# Patient Record
Sex: FEMALE | Race: WHITE | NOT HISPANIC OR LATINO | Employment: FULL TIME | URBAN - METROPOLITAN AREA
[De-identification: names, ages, dates, MRNs, and addresses within clinical notes are randomized per-mention and may not be internally consistent; named-entity substitution may affect disease eponyms.]

---

## 2018-02-12 ENCOUNTER — TELEPHONE (OUTPATIENT)
Dept: SURGERY | Facility: CLINIC | Age: 28
End: 2018-02-12

## 2018-02-12 NOTE — TELEPHONE ENCOUNTER
Referred from the [employer] website.  No longer wants the [employer].  Discussed medical weight management, gastric balloon and sleeve gastrectomy.  States is interested in the medical weight management option, given contact number to call the Call Center and set up appt. (also needs to update insurance in Epic).

## 2018-07-30 ENCOUNTER — OFFICE VISIT (OUTPATIENT)
Dept: FAMILY MEDICINE | Facility: OTHER | Age: 28
End: 2018-07-30
Attending: NURSE PRACTITIONER
Payer: COMMERCIAL

## 2018-07-30 VITALS — SYSTOLIC BLOOD PRESSURE: 126 MMHG | DIASTOLIC BLOOD PRESSURE: 80 MMHG | WEIGHT: 293 LBS | TEMPERATURE: 97.4 F

## 2018-07-30 DIAGNOSIS — N32.9 BLADDER PROBLEM: Primary | ICD-10-CM

## 2018-07-30 DIAGNOSIS — R10.31 ABDOMINAL PAIN, RIGHT LOWER QUADRANT: ICD-10-CM

## 2018-07-30 LAB
ALBUMIN UR-MCNC: NEGATIVE MG/DL
ANION GAP SERPL CALCULATED.3IONS-SCNC: 8 MMOL/L (ref 3–14)
APPEARANCE UR: CLEAR
BASOPHILS # BLD AUTO: 0 10E9/L (ref 0–0.2)
BASOPHILS NFR BLD AUTO: 0.1 %
BILIRUB UR QL STRIP: NEGATIVE
BUN SERPL-MCNC: 6 MG/DL (ref 7–25)
CALCIUM SERPL-MCNC: 9.1 MG/DL (ref 8.6–10.3)
CHLORIDE SERPL-SCNC: 108 MMOL/L (ref 98–107)
CO2 SERPL-SCNC: 22 MMOL/L (ref 21–31)
COLOR UR AUTO: YELLOW
CREAT SERPL-MCNC: 0.7 MG/DL (ref 0.6–1.2)
DIFFERENTIAL METHOD BLD: ABNORMAL
EOSINOPHIL # BLD AUTO: 0.1 10E9/L (ref 0–0.7)
EOSINOPHIL NFR BLD AUTO: 1.4 %
ERYTHROCYTE [DISTWIDTH] IN BLOOD BY AUTOMATED COUNT: 17.8 % (ref 10–15)
GFR SERPL CREATININE-BSD FRML MDRD: >90 ML/MIN/1.7M2
GLUCOSE SERPL-MCNC: 130 MG/DL (ref 70–105)
GLUCOSE UR STRIP-MCNC: NEGATIVE MG/DL
HCT VFR BLD AUTO: 40.8 % (ref 35–47)
HGB BLD-MCNC: 12.7 G/DL (ref 11.7–15.7)
HGB UR QL STRIP: NEGATIVE
IMM GRANULOCYTES # BLD: 0.1 10E9/L (ref 0–0.4)
IMM GRANULOCYTES NFR BLD: 1 %
KETONES UR STRIP-MCNC: NEGATIVE MG/DL
LEUKOCYTE ESTERASE UR QL STRIP: ABNORMAL
LYMPHOCYTES # BLD AUTO: 2.2 10E9/L (ref 0.8–5.3)
LYMPHOCYTES NFR BLD AUTO: 21.9 %
MCH RBC QN AUTO: 23.1 PG (ref 26.5–33)
MCHC RBC AUTO-ENTMCNC: 31.1 G/DL (ref 31.5–36.5)
MCV RBC AUTO: 74 FL (ref 78–100)
MONOCYTES # BLD AUTO: 0.4 10E9/L (ref 0–1.3)
MONOCYTES NFR BLD AUTO: 3.8 %
NEUTROPHILS # BLD AUTO: 7.1 10E9/L (ref 1.6–8.3)
NEUTROPHILS NFR BLD AUTO: 71.8 %
NITRATE UR QL: NEGATIVE
PH UR STRIP: 6.5 PH (ref 5–9)
PLATELET # BLD AUTO: 300 10E9/L (ref 150–450)
POTASSIUM SERPL-SCNC: 3.8 MMOL/L (ref 3.5–5.1)
RBC # BLD AUTO: 5.5 10E12/L (ref 3.8–5.2)
RBC #/AREA URNS AUTO: NORMAL /HPF
SODIUM SERPL-SCNC: 138 MMOL/L (ref 134–144)
SOURCE: ABNORMAL
SP GR UR STRIP: 1.01 (ref 1–1.03)
UROBILINOGEN UR STRIP-ACNC: 0.2 EU/DL (ref 0.2–1)
WBC # BLD AUTO: 10 10E9/L (ref 4–11)
WBC #/AREA URNS AUTO: NORMAL /HPF

## 2018-07-30 PROCEDURE — G0463 HOSPITAL OUTPT CLINIC VISIT: HCPCS

## 2018-07-30 PROCEDURE — 80048 BASIC METABOLIC PNL TOTAL CA: CPT | Performed by: NURSE PRACTITIONER

## 2018-07-30 PROCEDURE — 87086 URINE CULTURE/COLONY COUNT: CPT | Performed by: NURSE PRACTITIONER

## 2018-07-30 PROCEDURE — 85025 COMPLETE CBC W/AUTO DIFF WBC: CPT | Performed by: NURSE PRACTITIONER

## 2018-07-30 PROCEDURE — 99203 OFFICE O/P NEW LOW 30 MIN: CPT | Performed by: NURSE PRACTITIONER

## 2018-07-30 PROCEDURE — 81001 URINALYSIS AUTO W/SCOPE: CPT | Performed by: NURSE PRACTITIONER

## 2018-07-30 PROCEDURE — 36415 COLL VENOUS BLD VENIPUNCTURE: CPT | Performed by: NURSE PRACTITIONER

## 2018-07-30 NOTE — PROGRESS NOTES
Nursing Notes:   Marie Ken CMA  7/30/2018  1:10 PM  Signed  Patient presents to clinic today for possible UTI. Having back pain, frequency, RLQ discomfort. Symptoms x 2 days.  Marie Ken [facility]..............7/30/2018........1:01 PM        SUBJECTIVE:   Gabbie Tavares is a 28 year old female who presents to clinic today for the following health issues:    URINARY TRACT SYMPTOMS      Duration: 2 days    Description  frequency and Back pain, RLQ pain    Intensity:  moderate    Accompanying signs and symptoms:  Fever/chills: no   Flank pain YES- Back pain, low back   Nausea and vomiting: no   Vaginal symptoms: none  Abdominal/Pelvic Pain: YES- RLQ pain    History  History of frequent UTI's: no   History of kidney stones: YES  Sexually Active: YES  Possibility of pregnancy: No    Precipitating or alleviating factors: None    Therapies tried and outcome: increase fluid intake and ibuprofen   Outcome: not helpful  Has tried gas meds, increased water, RT flank pain and into pelvis. Pain is constant, uncomfortable and worse with standing, walking, better/not as intense with resting, sitting.     Surgery: has had gallbladder out.   Problem list and histories reviewed & adjusted, as indicated.  Additional history: as documented    Current Outpatient Prescriptions   Medication Sig Dispense Refill     norgestrel-ethinyl estradiol (LOW-OGESTREL) 0.3-30 MG-MCG per tablet Take 1 tablet by mouth       Allergies   Allergen Reactions     Acetaminophen Nausea and Vomiting     Latex Other (See Comments)     LATEX PRECAUTIONS due to hx of seasonal allergies and frequent exposure to latex     Penicillins Rash         ROS:  Notable findings in the HPI.       OBJECTIVE:     /80  Temp 97.4  F (36.3  C) (Tympanic)  Wt 310 lb 4.8 oz (140.8 kg)  LMP 06/27/2018 (Exact Date)  Breastfeeding? No  There is no height or weight on file to calculate BMI.  GENERAL: healthy, alert and no distress  EYES: Eyes grossly normal to  inspection  HENT: normal cephalic/atraumatic and oral mucous membranes moist  NECK: no adenopathy  RESP: lungs clear to auscultation - no rales, rhonchi or wheezes  CV: regular rate and rhythm, normal S1 S2, no S3 or S4, no murmur, click or rub, no peripheral edema and peripheral pulses strong  ABDOMEN: tenderness RLQ, no organomegaly or masses, liver span normal to percussion and bowel sounds normal  SKIN: no suspicious lesions or rashes  BACK: no CVA tenderness, no paralumbar tenderness  PSYCH: mentation appears normal, affect normal/bright    Diagnostic Test Results:  Results for orders placed or performed in visit on 07/30/18 (from the past 24 hour(s))   *UA reflex to Microscopic   Result Value Ref Range    Color Urine Yellow     Appearance Urine Clear     Glucose Urine Negative NEG^Negative mg/dL    Bilirubin Urine Negative NEG^Negative    Ketones Urine Negative NEG^Negative mg/dL    Specific Gravity Urine 1.010 1.000 - 1.030    Blood Urine Negative NEG^Negative    pH Urine 6.5 5.0 - 9.0 pH    Protein Albumin Urine Negative NEG^Negative mg/dL    Urobilinogen Urine 0.2 0.2 - 1.0 EU/dL    Nitrite Urine Negative NEG^Negative    Leukocyte Esterase Urine Trace (A) NEG^Negative    Source Midstream Urine    Urine Microscopic   Result Value Ref Range    WBC Urine 0 - 5 OTO5^0 - 5 /HPF    RBC Urine O - 2 OTO2^O - 2 /HPF   CBC with platelets differential   Result Value Ref Range    WBC 10.0 4.0 - 11.0 10e9/L    RBC Count 5.50 (H) 3.8 - 5.2 10e12/L    Hemoglobin 12.7 11.7 - 15.7 g/dL    Hematocrit 40.8 35.0 - 47.0 %    MCV 74 (L) 78 - 100 fl    MCH 23.1 (L) 26.5 - 33.0 pg    MCHC 31.1 (L) 31.5 - 36.5 g/dL    RDW 17.8 (H) 10.0 - 15.0 %    Platelet Count 300 150 - 450 10e9/L    Diff Method Automated Method     % Neutrophils 71.8 %    % Lymphocytes 21.9 %    % Monocytes 3.8 %    % Eosinophils 1.4 %    % Basophils 0.1 %    % Immature Granulocytes 1.0 %    Absolute Neutrophil 7.1 1.6 - 8.3 10e9/L    Absolute Lymphocytes 2.2  0.8 - 5.3 10e9/L    Absolute Monocytes 0.4 0.0 - 1.3 10e9/L    Absolute Eosinophils 0.1 0.0 - 0.7 10e9/L    Absolute Basophils 0.0 0.0 - 0.2 10e9/L    Abs Immature Granulocytes 0.1 0 - 0.4 10e9/L       ASSESSMENT/PLAN:     1. Bladder problem  - *UA reflex to Microscopic  - Urine Microscopic  - Urine Culture Aerobic Bacterial    2. Abdominal pain, right lower quadrant  - CBC with platelets differential  - Basic Metabolic Panel  - Urine Culture Aerobic Bacterial    Medical Decision Making:    Differential Diagnosis:  Abdominal Pain: Appendix, UTI, Pyelonephritis, Kidney Stone, Abdominal Wall and Non Specific    Serious Comorbid Conditions:  Adult:  None    PLAN:    ABD Pain:  Tylenol, Ibuprofen, Miralax, Fluids, time, rest and Refer to ED if pain worsens or not improving. The labs are reassuring, f/u if needed.     Followup:    If not improving or if condition worsens, follow up with your Primary Care Provider    Disclaimer:  This note consists of words and symbols derived from keyboarding, dictation, or using voice recognition software. As a result, there may be errors in the script that have gone undetected. Please consider this when interpreting information found in this note.      Khadijah Williamson NP, 7/30/2018 12:58 PM

## 2018-07-30 NOTE — NURSING NOTE
Patient presents to clinic today for possible UTI. Having back pain, frequency, RLQ discomfort. Symptoms x 2 days.  Marie Ken CMA..............7/30/2018........1:01 PM

## 2018-07-30 NOTE — PATIENT INSTRUCTIONS

## 2018-07-30 NOTE — MR AVS SNAPSHOT
After Visit Summary   7/30/2018    Gabbie Tavares    MRN: [id number]           Patient Information     Date Of Birth          1990        Visit Information        Provider Department      7/30/2018 11:30 AM Khadijah Williamson NP [facility]        Today's Diagnoses     Bladder problem    -  1    Abdominal pain, right lower quadrant          Care Instructions      Abdominal Pain  Abdominal pain is pain in the stomach or belly area. Everyone has this pain from time to time. In many cases it goes away on its own. But abdominal pain can sometimes be due to a serious problem, such as appendicitis. So it s important to know when to seek help.  Causes of abdominal pain  There are many possible causes of abdominal pain. Common causes in adults include:    Constipation, diarrhea, or gas    Stomach acid flowing back up into the esophagus (acid reflux or heartburn)    Severe acid reflux, called GERD (gastroesophageal reflux disease)    A sore in the lining of the stomach or small intestine (peptic ulcer)    Inflammation of the gallbladder, liver, or pancreas    Gallstones or kidney stones    Appendicitis     Intestinal blockage     An internal organ pushing through a muscle or other tissue (hernia)    Urinary tract infections    In women, menstrual cramps, fibroids, or endometriosis    Inflammation or infection of the intestines  Diagnosing the cause of abdominal pain  Your healthcare provider will do a physical exam help find the cause of your pain. If needed, tests will be ordered. Belly pain has many possible causes. So it can be hard to find the reason for your pain. Giving details about your pain can help. Tell your provider where and when you feel the pain, and what makes it better or worse. Also let your provider know if you have other symptoms such as:    Fever    Tiredness    Upset stomach (nausea)    Vomiting    Changes in bathroom habits  Treating abdominal pain  Some causes  of pain need emergency medical treatment right away. These include appendicitis or a bowel blockage. Other problems can be treated with rest, fluids, or medicines. Your healthcare provider can give you specific instructions for treatment or self-care based on what is causing your pain.  If you have vomiting or diarrhea, sip water or other clear fluids. When you are ready to eat solid foods again, start with small amounts of easy-to-digest, low-fat foods. These include apple sauce, toast, or crackers.   When to seek medical care  Call 911 or go to the hospital right away if you:    Can t pass stool and are vomiting    Are vomiting blood or have bloody diarrhea or black, tarry diarrhea    Have chest, neck, or shoulder pain    Feel like you might pass out    Have pain in your shoulder blades with nausea    Have sudden, severe belly pain    Have new, severe pain unlike any you have felt before    Have a belly that is rigid, hard, and tender to touch  Call your healthcare provider if you have:    Pain for more than 5 days    Bloating for more than 2 days    Diarrhea for more than 5 days    A fever of 100.4 F (38 C) or higher, or as directed by your healthcare provider    Pain that gets worse    Weight loss for no reason    Continued lack of appetite    Blood in your stool  How to prevent abdominal pain  Here are some tips to help prevent abdominal pain:    Eat smaller amounts of food at one time.    Avoid greasy, fried, or other high-fat foods.    Avoid foods that give you gas.    Exercise regularly.    Drink plenty of fluids.  To help prevent GERD symptoms:    Quit smoking.    Reduce alcohol and certain foods that increase stomach acid.    Avoid aspirin and over-the-counter pain and fever medicines (NSAIDS or nonsteroidal anti-inflammatory drugs), if possible    Lose extra weight.    Finish eating at least 2 hours before you go to bed or lie down.    Raise the head of your bed.                  Follow-ups after your  "visit        Who to contact     If you have questions or need follow up information about today's clinic visit or your schedule please contact [facility] directly at [phone].  Normal or non-critical lab and imaging results will be communicated to you by MyChart, letter or phone within 4 business days after the clinic has received the results. If you do not hear from us within 7 days, please contact the clinic through MyChart or phone. If you have a critical or abnormal lab result, we will notify you by phone as soon as possible.  Submit refill requests through [employer] or call your pharmacy and they will forward the refill request to us. Please allow 3 business days for your refill to be completed.          Additional Information About Your Visit        [email or web address] Information     [employer] lets you send messages to your doctor, view your test results, renew your prescriptions, schedule appointments and more. To sign up, go to [email or web address] . Click on \"Log in\" on the left side of the screen, which will take you to the Welcome page. Then click on \"Sign up Now\" on the right side of the page.     You will be asked to enter the access code listed below, as well as some personal information. Please follow the directions to create your username and password.     Your access code is: [email or web address]  Expires: 10/28/2018  1:48 PM     Your access code will  in 90 days. If you need help or a new code, please call your [place] clinic or [phone].        Care EveryWhere ID     This is your Care EveryWhere ID. This could be used by other organizations to access your [place] medical records  [id number]        Your Vitals Were     Temperature Last Period Breastfeeding?             97.4  F (36.3  C) (Tympanic) 2018 (Exact Date) No          Blood Pressure from Last 3 Encounters:   18 126/80    Weight from Last 3 Encounters:   18 310 lb 4.8 oz (140.8 kg)              We " Performed the Following     *UA reflex to Microscopic     Basic Metabolic Panel     CBC with platelets differential     Urine Culture Aerobic Bacterial     Urine Microscopic        Primary Care Provider Fax #    Physician No Ref-Primary [phone]       No address on file        Equal Access to Services     VIVIAN GABRIEL : Hadii aad ku hadmariangel Marquez, naeem dhillon, bota kashayne galarza, sandra servin darwinpatricia strong aliya lockhart. So [facility] [phone].    ATENCIÓN: Si habla español, tiene a choudhury disposición servicios gratuitos de asistencia lingüística. Llame al [phone].    We comply with applicable federal civil rights laws and [place] laws. We do not discriminate on the basis of race, color, national origin, age, disability, sex, sexual orientation, or gender identity.            Thank you!     Thank you for choosing [facility]  for your care. Our goal is always to provide you with excellent care. Hearing back from our patients is one way we can continue to improve our services. Please take a few minutes to complete the written survey that you may receive in the mail after your visit with us. Thank you!             Your Updated Medication List - Protect others around you: Learn how to safely use, store and throw away your medicines at www.disposemymeds.org.          This list is accurate as of 7/30/18  1:48 PM.  Always use your most recent med list.                   Brand Name Dispense Instructions for use Diagnosis    LOW-OGESTREL 0.3-30 MG-MCG per tablet   Generic drug:  norgestrel-ethinyl estradiol      Take 1 tablet by mouth

## 2018-08-02 LAB
BACTERIA SPEC CULT: NORMAL
SPECIMEN SOURCE: NORMAL

## 2019-04-30 DIAGNOSIS — O36.80X0 ENCOUNTER TO DETERMINE FETAL VIABILITY OF PREGNANCY, SINGLE OR UNSPECIFIED FETUS: Primary | ICD-10-CM

## 2019-04-30 NOTE — PROGRESS NOTES
Patient is 5-7 months pregnant and here tomorrow for first OB appointment.  Ultrasound ordered.  US notified and will schedule patient at 10 or 11:15 tomorrow.     Coco Houston RN on 4/30/2019 at 4:58 PM

## 2019-05-01 ENCOUNTER — HOSPITAL ENCOUNTER (OUTPATIENT)
Dept: ULTRASOUND IMAGING | Facility: OTHER | Age: 29
Discharge: HOME | End: 2019-05-01
Attending: OBSTETRICS & GYNECOLOGY | Admitting: OBSTETRICS & GYNECOLOGY
Payer: COMMERCIAL

## 2019-05-01 ENCOUNTER — PRENATAL OFFICE VISIT (OUTPATIENT)
Dept: OBGYN | Facility: OTHER | Age: 29
End: 2019-05-01
Attending: OBSTETRICS & GYNECOLOGY
Payer: COMMERCIAL

## 2019-05-01 VITALS
HEART RATE: 72 BPM | WEIGHT: 293 LBS | HEIGHT: 66 IN | DIASTOLIC BLOOD PRESSURE: 70 MMHG | SYSTOLIC BLOOD PRESSURE: 122 MMHG | BODY MASS INDEX: 47.09 KG/M2

## 2019-05-01 DIAGNOSIS — O36.80X0 ENCOUNTER TO DETERMINE FETAL VIABILITY OF PREGNANCY, SINGLE OR UNSPECIFIED FETUS: ICD-10-CM

## 2019-05-01 DIAGNOSIS — Z32.01 PREGNANCY CONFIRMED BY POSITIVE URINE TEST: Primary | ICD-10-CM

## 2019-05-01 LAB — B-HCG SERPL-ACNC: <1 IU/L

## 2019-05-01 PROCEDURE — 76801 OB US < 14 WKS SINGLE FETUS: CPT

## 2019-05-01 PROCEDURE — G0463 HOSPITAL OUTPT CLINIC VISIT: HCPCS | Mod: 25

## 2019-05-01 PROCEDURE — 99214 OFFICE O/P EST MOD 30 MIN: CPT | Performed by: OBSTETRICS & GYNECOLOGY

## 2019-05-01 PROCEDURE — 84702 CHORIONIC GONADOTROPIN TEST: CPT | Mod: ZL | Performed by: OBSTETRICS & GYNECOLOGY

## 2019-05-01 PROCEDURE — G0463 HOSPITAL OUTPT CLINIC VISIT: HCPCS

## 2019-05-01 PROCEDURE — 36415 COLL VENOUS BLD VENIPUNCTURE: CPT | Mod: ZL | Performed by: OBSTETRICS & GYNECOLOGY

## 2019-05-01 ASSESSMENT — MIFFLIN-ST. JEOR: SCORE: 2102.54

## 2019-05-01 ASSESSMENT — PAIN SCALES - GENERAL: PAINLEVEL: NO PAIN (0)

## 2019-05-01 NOTE — NURSING NOTE
Chief Complaint   Patient presents with     Consult     amenorrhea    amenorrhea X LMP 11/16/18  Has had negative pregnancy tests but recently had an extra faint line.  Sister just had a baby and stated her pregnancy and HCG blood levels were normal during entire pregnancy.      Cecy Yung LPN........................5/1/2019  9:14 AM     Medication Reconciliation: completed   Cecy Yung LPN  5/1/2019 9:13 AM

## 2019-05-01 NOTE — PROGRESS NOTES
CC: possible pregnancy  HPI:  Gabbie is a 29 year old female who presents for amenorrhea since November 2018. She states that she took a home pregnancy test and that it was faintly positive, has not been trying to get pregnant. No birth control use since August of 2018, not currently desiring to get pregnant. Had some mild spotting in February but no other vaginal bleeding. She does not desire any hormonal or implanted birth control, would possibly want another pregnancy in the future.    Patient's last menstrual period was 11/16/2018.  Menstrual history: regular until about mid 2017, then prolonged bleeding and started OCPs.   Bladder concerns: denies  Bowel concerns: denies  Breast concerns: denies  Birth control: none  STI history: denies    Pap Smears: 3/22/18 NIL    OB History   No data available     No past medical history on file.  No past surgical history on file.  Social History     Socioeconomic History     Marital status: Single     Spouse name: Not on file     Number of children: Not on file     Years of education: Not on file     Highest education level: Not on file   Occupational History     Not on file   Social Needs     Financial resource strain: Not on file     Food insecurity:     Worry: Not on file     Inability: Not on file     Transportation needs:     Medical: Not on file     Non-medical: Not on file   Tobacco Use     Smoking status: Current Every Day Smoker     Smokeless tobacco: Never Used   Substance and Sexual Activity     Alcohol use: Yes     Drug use: No     Sexual activity: Yes     Partners: Male     Birth control/protection: Pill   Lifestyle     Physical activity:     Days per week: Not on file     Minutes per session: Not on file     Stress: Not on file   Relationships     Social connections:     Talks on phone: Not on file     Gets together: Not on file     Attends [employer] service: Not on file     Active member of club or organization: Not on file     Attends meetings of clubs or  "organizations: Not on file     Relationship status: Not on file     Intimate partner violence:     Fear of current or ex partner: Not on file     Emotionally abused: Not on file     Physically abused: Not on file     Forced sexual activity: Not on file   Other Topics Concern     Parent/sibling w/ CABG, MI or angioplasty before 65F 55M? Not Asked   Social History Narrative     Not on file     No family history on file.    Current Outpatient Medications   Medication     norgestrel-ethinyl estradiol (LOW-OGESTREL) 0.3-30 MG-MCG per tablet     No current facility-administered medications for this visit.      Allergies   Allergen Reactions     Acetaminophen Nausea and Vomiting     Other reaction(s): Gastrointestinal  Mild per pt.     Latex Other (See Comments)     LATEX PRECAUTIONS due to hx of seasonal allergies and frequent exposure to latex     Penicillins Rash     PN: LW Reaction: rash/facial swelling     /70 (BP Location: Right arm, Patient Position: Sitting, Cuff Size: Adult Large)   Pulse 72   Ht 1.676 m (5' 6\")   Wt 136.1 kg (300 lb)   LMP 11/16/2018   Breastfeeding? No   BMI 48.42 kg/m      REVIEW OF SYSTEMS  General: negative, fever, chills  GI: negative, nausea and vomiting  : negative, sexually transmitted disease and vaginal bleeding  Endocrine: negative, thyroid disorder and galactorrhea    Exam:  Constitutional: healthy, alert and no distress  Head: Normocephalic. No masses, lesions, tenderness or abnormalities  Psychiatric: mentation appears normal and affect normal/bright        Lab:   Results for orders placed or performed in visit on 07/30/18   *UA reflex to Microscopic   Result Value Ref Range    Color Urine Yellow     Appearance Urine Clear     Glucose Urine Negative NEG^Negative mg/dL    Bilirubin Urine Negative NEG^Negative    Ketones Urine Negative NEG^Negative mg/dL    Specific Gravity Urine 1.010 1.000 - 1.030    Blood Urine Negative NEG^Negative    pH Urine 6.5 5.0 - 9.0 pH    " Protein Albumin Urine Negative NEG^Negative mg/dL    Urobilinogen Urine 0.2 0.2 - 1.0 EU/dL    Nitrite Urine Negative NEG^Negative    Leukocyte Esterase Urine Trace (A) NEG^Negative    Source Midstream Urine    Urine Microscopic   Result Value Ref Range    WBC Urine 0 - 5 OTO5^0 - 5 /HPF    RBC Urine O - 2 OTO2^O - 2 /HPF   CBC with platelets differential   Result Value Ref Range    WBC 10.0 4.0 - 11.0 10e9/L    RBC Count 5.50 (H) 3.8 - 5.2 10e12/L    Hemoglobin 12.7 11.7 - 15.7 g/dL    Hematocrit 40.8 35.0 - 47.0 %    MCV 74 (L) 78 - 100 fl    MCH 23.1 (L) 26.5 - 33.0 pg    MCHC 31.1 (L) 31.5 - 36.5 g/dL    RDW 17.8 (H) 10.0 - 15.0 %    Platelet Count 300 150 - 450 10e9/L    Diff Method Automated Method     % Neutrophils 71.8 %    % Lymphocytes 21.9 %    % Monocytes 3.8 %    % Eosinophils 1.4 %    % Basophils 0.1 %    % Immature Granulocytes 1.0 %    Absolute Neutrophil 7.1 1.6 - 8.3 10e9/L    Absolute Lymphocytes 2.2 0.8 - 5.3 10e9/L    Absolute Monocytes 0.4 0.0 - 1.3 10e9/L    Absolute Eosinophils 0.1 0.0 - 0.7 10e9/L    Absolute Basophils 0.0 0.0 - 0.2 10e9/L    Abs Immature Granulocytes 0.1 0 - 0.4 10e9/L   Basic Metabolic Panel   Result Value Ref Range    Sodium 138 134 - 144 mmol/L    Potassium 3.8 3.5 - 5.1 mmol/L    Chloride 108 (H) 98 - 107 mmol/L    Carbon Dioxide 22 21 - 31 mmol/L    Anion Gap 8 3 - 14 mmol/L    Glucose 130 (H) 70 - 105 mg/dL    Urea Nitrogen 6 (L) 7 - 25 mg/dL    Creatinine 0.70 0.60 - 1.20 mg/dL    GFR Estimate >90 >60 mL/min/1.7m2    GFR Estimate If Black >90 >60 mL/min/1.7m2    Calcium 9.1 8.6 - 10.3 mg/dL   Urine Culture Aerobic Bacterial   Result Value Ref Range    Specimen Description Midstream Urine     Culture Micro       10,000 to 50,000 colonies/mL  mixed urogenital garfield  No further identification or sensitivity done         ASSESSMENT/PLAN :  1. Pregnancy confirmed by positive urine test          29 year old female with positive home pregnancy test. OB US today inconclusive  - possible very early gestational sac vs endometrial fluid collection/cyst. Ordered quantitative HCG today to further evaluate pregnancy and will follow up with patient with results. Plan to return to clinic in about 2 weeks for further evaluation.    Patient evaluated by Shelby Bell MD  10:27 AM 5/1/2019     Addendum:  Quant hcg negative  Will have patient return in 2 weeks, repeat urine hcg at that time.  (May need repeat US to convince patient that she is not pregnant)  Would be a good candidate for a Mirena

## 2019-05-01 NOTE — RESULT ENCOUNTER NOTE
Gabbie had a + urine Hcg but our serum is negative.  I would like her to see me back in 2 weeks.  Please obtain a urine hcg when she arrives.  Thank you

## 2019-05-03 ENCOUNTER — HEALTH MAINTENANCE LETTER (OUTPATIENT)
Age: 29
End: 2019-05-03

## 2019-07-28 ENCOUNTER — OFFICE VISIT (OUTPATIENT)
Dept: FAMILY MEDICINE | Facility: OTHER | Age: 29
End: 2019-07-28
Attending: NURSE PRACTITIONER
Payer: COMMERCIAL

## 2019-07-28 VITALS
RESPIRATION RATE: 16 BRPM | HEIGHT: 66 IN | SYSTOLIC BLOOD PRESSURE: 118 MMHG | TEMPERATURE: 97.9 F | BODY MASS INDEX: 47.09 KG/M2 | HEART RATE: 72 BPM | WEIGHT: 293 LBS | DIASTOLIC BLOOD PRESSURE: 78 MMHG

## 2019-07-28 DIAGNOSIS — J02.0 STREP THROAT: Primary | ICD-10-CM

## 2019-07-28 LAB
DEPRECATED S PYO AG THROAT QL EIA: ABNORMAL
SPECIMEN SOURCE: ABNORMAL

## 2019-07-28 PROCEDURE — G0463 HOSPITAL OUTPT CLINIC VISIT: HCPCS

## 2019-07-28 PROCEDURE — 87880 STREP A ASSAY W/OPTIC: CPT | Mod: ZL | Performed by: NURSE PRACTITIONER

## 2019-07-28 PROCEDURE — 99213 OFFICE O/P EST LOW 20 MIN: CPT | Performed by: FAMILY MEDICINE

## 2019-07-28 RX ORDER — AZITHROMYCIN 250 MG/1
250 TABLET, FILM COATED ORAL DAILY
Qty: 6 TABLET | Refills: 0 | Status: SHIPPED | OUTPATIENT
Start: 2019-07-28 | End: 2019-07-30

## 2019-07-28 RX ORDER — AZITHROMYCIN 250 MG/1
250 TABLET, FILM COATED ORAL DAILY
Status: DISCONTINUED | OUTPATIENT
Start: 2019-07-29 | End: 2020-10-16

## 2019-07-28 ASSESSMENT — PAIN SCALES - GENERAL: PAINLEVEL: EXTREME PAIN (8)

## 2019-07-28 ASSESSMENT — MIFFLIN-ST. JEOR: SCORE: 2102.54

## 2019-07-29 NOTE — NURSING NOTE
"Chief Complaint   Patient presents with     Pharyngitis     Pt present to clinic today for a sore throat she has had for 3 days.    Initial /78 (BP Location: Left arm, Patient Position: Sitting, Cuff Size: Adult Large)   Pulse 72   Temp 97.9  F (36.6  C) (Tympanic)   Resp 16   Ht 1.676 m (5' 6\")   Wt 136.1 kg (300 lb)   BMI 48.42 kg/m   Estimated body mass index is 48.42 kg/m  as calculated from the following:    Height as of this encounter: 1.676 m (5' 6\").    Weight as of this encounter: 136.1 kg (300 lb).  Medication Reconciliation: complete    Milli Galloway LPN  "

## 2019-07-30 ENCOUNTER — TELEPHONE (OUTPATIENT)
Dept: FAMILY MEDICINE | Facility: OTHER | Age: 29
End: 2019-07-30

## 2019-07-30 DIAGNOSIS — J02.0 STREP THROAT: ICD-10-CM

## 2019-07-30 RX ORDER — AZITHROMYCIN 250 MG/1
250 TABLET, FILM COATED ORAL DAILY
Qty: 6 TABLET | Refills: 0 | Status: SHIPPED | OUTPATIENT
Start: 2019-07-30 | End: 2020-10-16

## 2019-07-30 NOTE — TELEPHONE ENCOUNTER
Refill sent to [facility]  Please notify patient  Sapna MEETA Bunch NP on 7/30/2019 at 2:00 PM

## 2019-07-30 NOTE — TELEPHONE ENCOUNTER
Patient states she was prescribed Zithromax for 5 days. She states she has 3 left and she was supposed to take two on the first day and then one daily after initial dose. She is inquiring about a refill so she can finish the series. Please advise.  Tierney Regan LPN on 7/30/2019 at 12:46 PM

## 2019-07-30 NOTE — TELEPHONE ENCOUNTER
Pt seen in [facility] by Yeimi Santos; given Z-Tushar from [employer]. Pt states remaining days/doses of medication were lost. Inquiring about options for refill/replacement or other course of action to take.  Ph. [phone]    Thank you.  Coco Temple

## 2019-08-19 ENCOUNTER — MYC MEDICAL ADVICE (OUTPATIENT)
Dept: OBGYN | Facility: OTHER | Age: 29
End: 2019-08-19

## 2020-03-11 ENCOUNTER — HEALTH MAINTENANCE LETTER (OUTPATIENT)
Age: 30
End: 2020-03-11

## 2020-10-16 ENCOUNTER — APPOINTMENT (OUTPATIENT)
Dept: CT IMAGING | Facility: OTHER | Age: 30
End: 2020-10-16
Attending: FAMILY MEDICINE
Payer: COMMERCIAL

## 2020-10-16 ENCOUNTER — OFFICE VISIT (OUTPATIENT)
Dept: FAMILY MEDICINE | Facility: OTHER | Age: 30
End: 2020-10-16
Attending: FAMILY MEDICINE
Payer: COMMERCIAL

## 2020-10-16 ENCOUNTER — HOSPITAL ENCOUNTER (EMERGENCY)
Facility: OTHER | Age: 30
Discharge: HOME | End: 2020-10-17
Attending: FAMILY MEDICINE | Admitting: FAMILY MEDICINE
Payer: COMMERCIAL

## 2020-10-16 VITALS
RESPIRATION RATE: 18 BRPM | BODY MASS INDEX: 48.2 KG/M2 | OXYGEN SATURATION: 98 % | TEMPERATURE: 98.2 F | SYSTOLIC BLOOD PRESSURE: 124 MMHG | WEIGHT: 289.3 LBS | DIASTOLIC BLOOD PRESSURE: 70 MMHG | HEART RATE: 80 BPM | HEIGHT: 65 IN

## 2020-10-16 DIAGNOSIS — J02.9 VIRAL PHARYNGITIS: ICD-10-CM

## 2020-10-16 DIAGNOSIS — K11.5 SALIVARY STONE: Primary | ICD-10-CM

## 2020-10-16 PROCEDURE — G0463 HOSPITAL OUTPT CLINIC VISIT: HCPCS

## 2020-10-16 PROCEDURE — G0463 HOSPITAL OUTPT CLINIC VISIT: HCPCS | Mod: 25,27

## 2020-10-16 PROCEDURE — 250N000011 HC RX IP 250 OP 636: Performed by: FAMILY MEDICINE

## 2020-10-16 PROCEDURE — 99285 EMERGENCY DEPT VISIT HI MDM: CPT | Mod: 25 | Performed by: FAMILY MEDICINE

## 2020-10-16 PROCEDURE — 70491 CT SOFT TISSUE NECK W/DYE: CPT | Mod: TC

## 2020-10-16 PROCEDURE — 85025 COMPLETE CBC W/AUTO DIFF WBC: CPT | Performed by: FAMILY MEDICINE

## 2020-10-16 PROCEDURE — 99213 OFFICE O/P EST LOW 20 MIN: CPT | Performed by: PHYSICIAN ASSISTANT

## 2020-10-16 PROCEDURE — 36415 COLL VENOUS BLD VENIPUNCTURE: CPT | Performed by: FAMILY MEDICINE

## 2020-10-16 PROCEDURE — 96374 THER/PROPH/DIAG INJ IV PUSH: CPT | Performed by: FAMILY MEDICINE

## 2020-10-16 PROCEDURE — 81025 URINE PREGNANCY TEST: CPT | Performed by: FAMILY MEDICINE

## 2020-10-16 PROCEDURE — 96375 TX/PRO/DX INJ NEW DRUG ADDON: CPT | Mod: XU | Performed by: FAMILY MEDICINE

## 2020-10-16 PROCEDURE — 84145 PROCALCITONIN (PCT): CPT | Performed by: FAMILY MEDICINE

## 2020-10-16 PROCEDURE — 258N000003 HC RX IP 258 OP 636: Performed by: FAMILY MEDICINE

## 2020-10-16 PROCEDURE — 99283 EMERGENCY DEPT VISIT LOW MDM: CPT | Performed by: FAMILY MEDICINE

## 2020-10-16 PROCEDURE — 86140 C-REACTIVE PROTEIN: CPT | Performed by: FAMILY MEDICINE

## 2020-10-16 PROCEDURE — 80048 BASIC METABOLIC PNL TOTAL CA: CPT | Performed by: FAMILY MEDICINE

## 2020-10-16 RX ORDER — ONDANSETRON 2 MG/ML
4 INJECTION INTRAMUSCULAR; INTRAVENOUS ONCE
Status: COMPLETED | OUTPATIENT
Start: 2020-10-16 | End: 2020-10-16

## 2020-10-16 RX ORDER — DEXAMETHASONE SODIUM PHOSPHATE 10 MG/ML
10 INJECTION, SOLUTION INTRAMUSCULAR; INTRAVENOUS ONCE
Status: COMPLETED | OUTPATIENT
Start: 2020-10-16 | End: 2020-10-16

## 2020-10-16 RX ORDER — SODIUM CHLORIDE 9 MG/ML
INJECTION, SOLUTION INTRAVENOUS CONTINUOUS
Status: DISCONTINUED | OUTPATIENT
Start: 2020-10-17 | End: 2020-10-17 | Stop reason: HOSPADM

## 2020-10-16 RX ORDER — MORPHINE SULFATE 4 MG/ML
4 INJECTION, SOLUTION INTRAMUSCULAR; INTRAVENOUS ONCE
Status: COMPLETED | OUTPATIENT
Start: 2020-10-16 | End: 2020-10-16

## 2020-10-16 RX ADMIN — MORPHINE SULFATE 4 MG: 4 INJECTION, SOLUTION INTRAMUSCULAR; INTRAVENOUS at 23:29

## 2020-10-16 RX ADMIN — DEXAMETHASONE SODIUM PHOSPHATE 10 MG: 10 INJECTION INTRAMUSCULAR; INTRAVENOUS at 23:20

## 2020-10-16 RX ADMIN — ONDANSETRON 4 MG: 2 INJECTION INTRAMUSCULAR; INTRAVENOUS at 23:20

## 2020-10-16 RX ADMIN — SODIUM CHLORIDE 1000 ML: 9 INJECTION, SOLUTION INTRAVENOUS at 23:21

## 2020-10-16 ASSESSMENT — ENCOUNTER SYMPTOMS
FEVER: 0
ABDOMINAL PAIN: 0
TROUBLE SWALLOWING: 0
NAUSEA: 0
VOMITING: 0
NECK PAIN: 1
EYE PAIN: 0
DYSURIA: 0
FACIAL SWELLING: 1
SORE THROAT: 0
HEADACHES: 0
COUGH: 0
COLOR CHANGE: 0
SHORTNESS OF BREATH: 0
DIARRHEA: 0
NECK STIFFNESS: 0
CHILLS: 0

## 2020-10-16 ASSESSMENT — MIFFLIN-ST. JEOR
SCORE: 2018.17
SCORE: 2033.14

## 2020-10-16 ASSESSMENT — PAIN SCALES - GENERAL: PAINLEVEL: MODERATE PAIN (4)

## 2020-10-16 NOTE — NURSING NOTE
"Chief Complaint   Patient presents with     Jaw Pain     She had a lump in her throat left side about a month ago which as since gone down. Left lower jaw started hurting yesterday. She states that she is getting woken up from the pain when she is sleeping.     Initial There were no vitals taken for this visit. Estimated body mass index is 48.42 kg/m  as calculated from the following:    Height as of 7/28/19: 1.676 m (5' 6\").    Weight as of 7/28/19: 136.1 kg (300 lb).    Medication Reconciliation: complete      Garland Roberson LPN  "

## 2020-10-16 NOTE — PROGRESS NOTES
"SUBJECTIVE:   Gabbie Tavares is a 30 year old female here for the following health issues:    HPI  24 hours left sided jaw pain that she rates as variable in pain intensity with 6-8/10 with palpation. . Had a swollen left sided tonsil about a month ago and now notices a return of symptoms. No tooth pain, discharge, bleeding, lock jaw, vision changes, temple pain, fever, chills, night sweats, n/v, cough, or any difficulty breathing or swallowing. States she has been taking ibuprofen with good pain relief.     Allergies:  Allergies   Allergen Reactions     Acetaminophen Nausea and Vomiting     Other reaction(s): Gastrointestinal  Mild per pt.     Latex Other (See Comments)     LATEX PRECAUTIONS due to hx of seasonal allergies and frequent exposure to latex     Penicillins Rash     PN: LW Reaction: rash/facial swelling       Review of Systems   As above otherwise ROS is unremarkable.     OBJECTIVE:     Vitals:    10/16/20 1630   BP: 124/70   Pulse: 80   Resp: 18   Temp: 98.2  F (36.8  C)   TempSrc: Tympanic   SpO2: 98%   Weight: 131.2 kg (289 lb 4.8 oz)   Height: 1.651 m (5' 5\")       Physical Exam  General Appearance: Pleasant, alert, appropriate appearance for age and circumstances, no acute distress  Head: Normocephalic, atraumatic  Eyes: PERRL, EOMI  Ears: TM's pearly gray and intact bilaterally. Normal auditory canals and external ears   OroPharynx: mild left sided tonsillar swelling about the size of half a grape/dime. No pain with palpatino of the mandible, parotid, or masseter muscle. No swelling noted to the jam area upon inspection externally. Mild tenderness to palpation of the left submandibular lymph node. Otherwise, dental hygiene adequate. Normal buccal mucosa. Normal pharynx. No exudates or petechia noted.  Neck: Supple. Left submandibular lymph node slightly tender to palpation with scant swelling compared to the right. Thyroid smooth and rubbery in texture without palpable nodules  Skin: no " concerning or new rashes  Neurologic Exam: CN 2-12 grossly intact.  Psychiatric Exam: Alert and oriented, appropriate affect    ASSESSMENT/PLAN:     1. Salivary stone        Physical exam reassuring. Although her pain is moderate there is good relief with ibuprofen. I advised she continue ibuprofen for pain relief and also add Tylenol, jaw rest, and icing. She agrees with this plan and was grateful for the advice.     Advised if her pain were to persist or worsen she may be suffering from an infected tooth, tonsil, parotid, or salivary gland. We could consider imaging and antibiotics if this continues but at this time her exam is reassuring, I see no signs of bony involvement, she is afebrile in clinic, and her symptoms started just yesterday.      She will follow up if symptoms persist or worsen. At that time I would consider US, XR, and possibly antibiotics.    EDGAR Ogden  [facility] AND [facility]    This document was prepared using voice generated software.  While every attempt was made for accuracy, grammatical errors may exist.

## 2020-10-16 NOTE — ED AVS SNAPSHOT
[facility]  [address]  [place] [address]  Phone: [phone]  Fax: [phone]                                    Gabbie Tavares   MRN: [id number]    Department: [facility]   Date of Visit: 10/16/2020           After Visit Summary Signature Page    I have received my discharge instructions, and my questions have been answered. I have discussed any challenges I see with this plan with the nurse or doctor.    ..........................................................................................................................................  Patient/Patient Representative Signature      ..........................................................................................................................................  Patient Representative Print Name and Relationship to Patient    ..................................................               ................................................  Date                                   Time    ..........................................................................................................................................  Reviewed by Signature/Title    ...................................................              ..............................................  Date                                               Time          22EPIC Rev 08/18

## 2020-10-17 VITALS
WEIGHT: 286 LBS | RESPIRATION RATE: 18 BRPM | TEMPERATURE: 98.1 F | DIASTOLIC BLOOD PRESSURE: 55 MMHG | HEART RATE: 71 BPM | SYSTOLIC BLOOD PRESSURE: 108 MMHG | HEIGHT: 65 IN | OXYGEN SATURATION: 97 % | BODY MASS INDEX: 47.65 KG/M2

## 2020-10-17 LAB
ANION GAP SERPL CALCULATED.3IONS-SCNC: 8 MMOL/L (ref 3–14)
BASOPHILS # BLD AUTO: 0 10E9/L (ref 0–0.2)
BASOPHILS NFR BLD AUTO: 0.1 %
BUN SERPL-MCNC: 11 MG/DL (ref 7–25)
CALCIUM SERPL-MCNC: 8.8 MG/DL (ref 8.6–10.3)
CHLORIDE SERPL-SCNC: 105 MMOL/L (ref 98–107)
CO2 SERPL-SCNC: 24 MMOL/L (ref 21–31)
CREAT SERPL-MCNC: 0.81 MG/DL (ref 0.6–1.2)
CRP SERPL-MCNC: 21.2 MG/L
DIFFERENTIAL METHOD BLD: ABNORMAL
EOSINOPHIL # BLD AUTO: 0.2 10E9/L (ref 0–0.7)
EOSINOPHIL NFR BLD AUTO: 2 %
ERYTHROCYTE [DISTWIDTH] IN BLOOD BY AUTOMATED COUNT: 20.5 % (ref 10–15)
GFR SERPL CREATININE-BSD FRML MDRD: 83 ML/MIN/{1.73_M2}
GLUCOSE SERPL-MCNC: 115 MG/DL (ref 70–105)
HCG UR QL: NEGATIVE
HCT VFR BLD AUTO: 29.7 % (ref 35–47)
HGB BLD-MCNC: 8 G/DL (ref 11.7–15.7)
IMM GRANULOCYTES # BLD: 0 10E9/L (ref 0–0.4)
IMM GRANULOCYTES NFR BLD: 0.5 %
LYMPHOCYTES # BLD AUTO: 2.4 10E9/L (ref 0.8–5.3)
LYMPHOCYTES NFR BLD AUTO: 28.9 %
MCH RBC QN AUTO: 16.8 PG (ref 26.5–33)
MCHC RBC AUTO-ENTMCNC: 26.8 G/DL (ref 31.5–36.5)
MCV RBC AUTO: 63 FL (ref 78–100)
MONOCYTES # BLD AUTO: 0.5 10E9/L (ref 0–1.3)
MONOCYTES NFR BLD AUTO: 6.3 %
NEUTROPHILS # BLD AUTO: 5.1 10E9/L (ref 1.6–8.3)
NEUTROPHILS NFR BLD AUTO: 62.2 %
PLATELET # BLD AUTO: 366 10E9/L (ref 150–450)
POTASSIUM SERPL-SCNC: 3.4 MMOL/L (ref 3.5–5.1)
PROCALCITONIN SERPL-MCNC: <0.5 NG/ML
RBC # BLD AUTO: 4.75 10E12/L (ref 3.8–5.2)
SODIUM SERPL-SCNC: 137 MMOL/L (ref 134–144)
WBC # BLD AUTO: 8.2 10E9/L (ref 4–11)

## 2020-10-17 PROCEDURE — 255N000002 HC RX 255 OP 636: Performed by: FAMILY MEDICINE

## 2020-10-17 RX ORDER — DEXAMETHASONE 4 MG/1
4 TABLET ORAL
Qty: 4 TABLET | Refills: 0 | Status: SHIPPED | OUTPATIENT
Start: 2020-10-17 | End: 2020-10-24

## 2020-10-17 RX ADMIN — IOHEXOL 100 ML: 350 INJECTION, SOLUTION INTRAVENOUS at 00:36

## 2020-10-17 NOTE — ED TRIAGE NOTES
[facility] and dx with left salivary stone . Told to take ibuprofen for pain and rest. Patient states increased swelling, increased pain since visit. Taking ibuprofen without relief.

## 2020-10-17 NOTE — ED PROVIDER NOTES
History     Chief Complaint   Patient presents with     Oral Swelling     HPI  Gabbie Tavares is a 30 year old female who presents to the emergency room complaining of left jaw and neck pain.  She states her symptoms started yesterday and have progressively worsened.  She states that the pain is sharp, aching, 8 out of 10 for severity, worsened by lying on her left side, touching the area.  She states that she was seen by her primary care physician earlier today and diagnosed with a salivary stone.  She was told to take ibuprofen and Tylenol.  She did not have any labs or radiology studies.    She denies any fever, chills, sore throat or difficulty swallowing, dental pain, cough, shortness of breath, chest pain, abdominal pain, nausea vomiting, diarrhea, dysuria.  No rash.    Allergies:  Allergies   Allergen Reactions     Acetaminophen Nausea and Vomiting     Other reaction(s): Gastrointestinal  Mild per pt.     Latex Other (See Comments)     LATEX PRECAUTIONS due to hx of seasonal allergies and frequent exposure to latex     Penicillins Rash     PN: LW Reaction: rash/facial swelling       Problem List:    There are no active problems to display for this patient.       Past Medical History:    No past medical history on file.    Past Surgical History:    No past surgical history on file.    Family History:    No family history on file.    Social History:  Marital Status:  Single [1]  Social History     Tobacco Use     Smoking status: Current Every Day Smoker     Smokeless tobacco: Never Used   Substance Use Topics     Alcohol use: Yes     Drug use: No        Medications:         dexamethasone (DECADRON) 4 MG tablet          Review of Systems   Constitutional: Negative for chills and fever.   HENT: Positive for facial swelling. Negative for congestion, dental problem, sore throat and trouble swallowing.    Eyes: Negative for pain.   Respiratory: Negative for cough and shortness of breath.    Cardiovascular:  "Negative for chest pain.   Gastrointestinal: Negative for abdominal pain, diarrhea, nausea and vomiting.   Genitourinary: Negative for dysuria.   Musculoskeletal: Positive for neck pain. Negative for neck stiffness.   Skin: Negative for color change and rash.   Neurological: Negative for headaches.   All other systems reviewed and are negative.      Physical Exam   BP: 132/58  Pulse: 76  Temp: 98.1  F (36.7  C)  Resp: 16  Height: 165.1 cm (5' 5\")  Weight: 129.7 kg (286 lb)  SpO2: 98 %      Physical Exam  Vitals signs and nursing note reviewed.   Constitutional:       General: She is not in acute distress.     Appearance: Normal appearance. She is well-developed. She is obese. She is not diaphoretic.   HENT:      Head: Normocephalic and atraumatic.      Jaw: There is normal jaw occlusion.        Comments: Tenderness just under left mandible.     Right Ear: External ear normal.      Left Ear: External ear normal.      Nose: Nose normal.      Mouth/Throat:      Lips: Pink.      Mouth: Mucous membranes are moist. No oral lesions or angioedema.      Dentition: Normal dentition. No dental tenderness, dental caries or gum lesions.      Pharynx: Oropharynx is clear. Uvula midline. No pharyngeal swelling, oropharyngeal exudate, posterior oropharyngeal erythema or uvula swelling.   Eyes:      Extraocular Movements: Extraocular movements intact.      Conjunctiva/sclera: Conjunctivae normal.      Pupils: Pupils are equal, round, and reactive to light.   Neck:      Musculoskeletal: Normal range of motion and neck supple.      Trachea: Trachea and phonation normal.   Cardiovascular:      Rate and Rhythm: Normal rate and regular rhythm.      Pulses: Normal pulses.      Heart sounds: Normal heart sounds. No murmur.   Pulmonary:      Effort: Pulmonary effort is normal.      Breath sounds: Normal breath sounds. No decreased breath sounds, wheezing, rhonchi or rales.   Abdominal:      General: Bowel sounds are normal.      Palpations: " Abdomen is soft.   Musculoskeletal: Normal range of motion.      Right lower leg: No edema.      Left lower leg: No edema.   Lymphadenopathy:      Cervical: No cervical adenopathy.   Skin:     General: Skin is warm.      Capillary Refill: Capillary refill takes less than 2 seconds.      Coloration: Skin is not pale.      Findings: No rash.   Neurological:      Mental Status: She is alert and oriented to person, place, and time.   Psychiatric:         Mood and Affect: Mood normal.         Behavior: Behavior normal. Behavior is cooperative.         ED Course     Procedures    Critical Care time:  none  Results for orders placed or performed during the hospital encounter of 10/16/20 (from the past 24 hour(s))   CBC with platelets differential   Result Value Ref Range    WBC 8.2 4.0 - 11.0 10e9/L    RBC Count 4.75 3.8 - 5.2 10e12/L    Hemoglobin 8.0 (L) 11.7 - 15.7 g/dL    Hematocrit 29.7 (L) 35.0 - 47.0 %    MCV 63 (L) 78 - 100 fl    MCH 16.8 (L) 26.5 - 33.0 pg    MCHC 26.8 (L) 31.5 - 36.5 g/dL    RDW 20.5 (H) 10.0 - 15.0 %    Platelet Count 366 150 - 450 10e9/L    Diff Method Automated Method     % Neutrophils 62.2 %    % Lymphocytes 28.9 %    % Monocytes 6.3 %    % Eosinophils 2.0 %    % Basophils 0.1 %    % Immature Granulocytes 0.5 %    Absolute Neutrophil 5.1 1.6 - 8.3 10e9/L    Absolute Lymphocytes 2.4 0.8 - 5.3 10e9/L    Absolute Monocytes 0.5 0.0 - 1.3 10e9/L    Absolute Eosinophils 0.2 0.0 - 0.7 10e9/L    Absolute Basophils 0.0 0.0 - 0.2 10e9/L    Abs Immature Granulocytes 0.0 0 - 0.4 10e9/L   Basic metabolic panel   Result Value Ref Range    Sodium 137 134 - 144 mmol/L    Potassium 3.4 (L) 3.5 - 5.1 mmol/L    Chloride 105 98 - 107 mmol/L    Carbon Dioxide 24 21 - 31 mmol/L    Anion Gap 8 3 - 14 mmol/L    Glucose 115 (H) 70 - 105 mg/dL    Urea Nitrogen 11 7 - 25 mg/dL    Creatinine 0.81 0.60 - 1.20 mg/dL    GFR Estimate 83 >60 mL/min/[1.73_m2]    GFR Estimate If Black >90 >60 mL/min/[1.73_m2]    Calcium 8.8  8.6 - 10.3 mg/dL   CRP inflammation   Result Value Ref Range    CRP Inflammation 21.2 (H) <10.0 mg/L   Procalcitonin   Result Value Ref Range    Procalcitonin <0.50 ng/ml   HCG qualitative urine   Result Value Ref Range    HCG Qual Urine Negative NEG^Negative   CT Soft Tissue Neck w Contrast    Narrative    PROCEDURE INFORMATION:   Exam: CT Neck With Contrast   Exam date and time: 10/16/2020 11:49 PM   Age: 30 years old   Clinical indication: Other: Left sided throat pain, area of intrested marked;   Patient HX: PT states that she has had left sided throat/jaw pain for the last   month. [facility] and dx with left salivary stone. Told to take   ibuprofen for pain and rest. Patient states increased swelling, increased pain   since visit; Additional info: Sore throat/stridor, epiglottis or tonsillitis   suspected     TECHNIQUE:   Imaging protocol: Computed tomography images of the neck with intravenous   contrast.   Radiation optimization: All CT scans at this facility use at least one of these   dose optimization techniques: automated exposure control; mA and/or kV   adjustment per patient size (includes targeted exams where dose is matched to   clinical indication); or iterative reconstruction.   Contrast material: OMNIPAQUE 350; Contrast volume: 100 ml; Contrast route:   INTRAVENOUS (IV);      COMPARISON:   No relevant prior studies available.     FINDINGS:   Brain: No abnormal intra or extra-axial enhancement.     Nasopharynx: Prominent the adenoids.   Oropharynx: Very mild lingual tonsillar enlargement.   Hypopharynx: Unremarkable.   Larynx: Nonvisualization of the area of the the current laryngeal noted the   level of the thorax. Mild thickening of the true and false vocal cords without   mass.   Retropharyngeal space: Unremarkable.   Submandibular/Parotid glands: Normal. Glands are normal in size.   Thyroid: Normal. No enlarged or calcified nodules.    Lymph nodes: Normal cervical  lymphadenopathy. Bilateral jugular and   submandibular lymphadenopathy noted to mild degree.     Trachea: Visualized trachea is unremarkable.   Lungs: Suboptimal imaging of the mediastinum.   Bones/joints: Unremarkable. No acute fracture.   Soft tissues: Unremarkable. No significant soft tissue swelling.       Impression    IMPRESSION:   1. Mild thickening of the true and false vocal cords suggests laryngitis   without mass lesion.   2. No tonsillar abscess.   3. Mild nonspecific adenoidal and tonsillar enlargement.     THIS DOCUMENT HAS BEEN ELECTRONICALLY SIGNED BY MANNY ZAMARRIPA MD       Medications   0.9% sodium chloride BOLUS (1,000 mLs Intravenous New Bag 10/16/20 2321)     Followed by   sodium chloride 0.9% infusion (has no administration in time range)   dexamethasone PF (DECADRON) injection 10 mg (10 mg Intravenous Given 10/16/20 2320)   morphine (PF) injection 4 mg (4 mg Intravenous Given 10/16/20 2329)   ondansetron (ZOFRAN) injection 4 mg (4 mg Intravenous Given 10/16/20 2320)   iohexol (OMNIPAQUE) 350 mg/mL solution 100 mL (100 mLs Intravenous Given 10/17/20 0036)       Assessments & Plan (with Medical Decision Making)     I have reviewed the nursing notes.    Labs are reviewed as above.  CRP is elevated.  WBCs are within normal limits, pro calcitonin is normal.  CT scan results reviewed as above.  Findings are consistent with viral pharyngitis/syndrome.  Patient is treated as above and is feeling better.    Patient is discharged home in good condition.  Follow-up with primary care physician as needed.  She is given a prescription for Decadron 4 mg daily for the next 4 days and is instructed to continue using ibuprofen and Tylenol.  The results of all the studies as well as the diagnosis and plan were discussed with the patient who voiced her understanding.  The patient is instructed to return to the emergency room should her symptoms worsen.    New Prescriptions    DEXAMETHASONE (DECADRON) 4 MG TABLET     Take 1 tablet (4 mg) by mouth daily (with breakfast) for 4 days       Final diagnoses:   Viral pharyngitis       10/16/2020   [facility]     João Jeter MD  10/17/20 0114

## 2020-10-17 NOTE — DISCHARGE INSTRUCTIONS
Ibuprofen 800 mg every 8 hours plus Tylenol 1000 mg every 6 hours.  Take Decadron 4 tomorrow morning.

## 2020-10-18 ENCOUNTER — HOSPITAL ENCOUNTER (EMERGENCY)
Facility: OTHER | Age: 30
Discharge: HOME | End: 2020-10-18
Attending: FAMILY MEDICINE | Admitting: FAMILY MEDICINE
Payer: COMMERCIAL

## 2020-10-18 VITALS
OXYGEN SATURATION: 99 % | HEART RATE: 83 BPM | RESPIRATION RATE: 18 BRPM | TEMPERATURE: 99.3 F | SYSTOLIC BLOOD PRESSURE: 127 MMHG | WEIGHT: 286 LBS | BODY MASS INDEX: 47.65 KG/M2 | HEIGHT: 65 IN | DIASTOLIC BLOOD PRESSURE: 54 MMHG

## 2020-10-18 DIAGNOSIS — K02.9 DENTAL CAVITY: ICD-10-CM

## 2020-10-18 DIAGNOSIS — K04.7 ABSCESSED TOOTH: ICD-10-CM

## 2020-10-18 PROCEDURE — 99282 EMERGENCY DEPT VISIT SF MDM: CPT | Performed by: PHYSICIAN ASSISTANT

## 2020-10-18 PROCEDURE — 99283 EMERGENCY DEPT VISIT LOW MDM: CPT | Performed by: PHYSICIAN ASSISTANT

## 2020-10-18 RX ORDER — CLINDAMYCIN HCL 300 MG
300 CAPSULE ORAL 4 TIMES DAILY
Qty: 40 CAPSULE | Refills: 0 | Status: SHIPPED | OUTPATIENT
Start: 2020-10-18 | End: 2020-10-28

## 2020-10-18 RX ORDER — HYDROCODONE BITARTRATE AND ACETAMINOPHEN 5; 325 MG/1; MG/1
2 TABLET ORAL
Qty: 6 TABLET | Refills: 0 | Status: SHIPPED | OUTPATIENT
Start: 2020-10-18 | End: 2020-10-24

## 2020-10-18 ASSESSMENT — MIFFLIN-ST. JEOR: SCORE: 2018.17

## 2020-10-18 ASSESSMENT — ENCOUNTER SYMPTOMS
CHEST TIGHTNESS: 0
BACK PAIN: 0
BRUISES/BLEEDS EASILY: 0
ADENOPATHY: 0
FEVER: 0
HEMATURIA: 0
CHILLS: 0
WOUND: 0
CONFUSION: 0
ABDOMINAL PAIN: 0
SHORTNESS OF BREATH: 0

## 2020-10-18 NOTE — ED PROVIDER NOTES
History     Chief Complaint   Patient presents with     Jaw Pain     HPI  Gabbie Tavares is a 30 year old female who was seen last night and diagnosed with viral pharyngitis.  She reports she continues to have left-sided lower jaw pain.  She also is noticing some warmth to her jaw as well.  Denies any fever or chills.  No sore throat or cough.  Furthermore she reports that the pain is so intense that she has not been able to sleep at night.  She has been trying Tylenol Motrin.  She reports she has 8 children and needs to get a good night sleep.    Allergies:  Allergies   Allergen Reactions     Acetaminophen Nausea and Vomiting     Other reaction(s): Gastrointestinal  Mild per pt.     Latex Other (See Comments)     LATEX PRECAUTIONS due to hx of seasonal allergies and frequent exposure to latex     Penicillins Rash     PN: LW Reaction: rash/facial swelling       Problem List:    There are no active problems to display for this patient.       Past Medical History:    No past medical history on file.    Past Surgical History:    No past surgical history on file.    Family History:    No family history on file.    Social History:  Marital Status:  Single [1]  Social History     Tobacco Use     Smoking status: Current Every Day Smoker     Smokeless tobacco: Never Used   Substance Use Topics     Alcohol use: Yes     Drug use: No        Medications:         clindamycin (CLEOCIN) 300 MG capsule       HYDROcodone-acetaminophen (NORCO) 5-325 MG tablet       dexamethasone (DECADRON) 4 MG tablet          Review of Systems   Constitutional: Negative for chills and fever.   HENT: Positive for dental problem. Negative for congestion.    Eyes: Negative for visual disturbance.   Respiratory: Negative for chest tightness and shortness of breath.    Cardiovascular: Negative for chest pain.   Gastrointestinal: Negative for abdominal pain.   Genitourinary: Negative for hematuria.   Musculoskeletal: Negative for back pain.   Skin:  "Negative for rash and wound.   Neurological: Negative for syncope.   Hematological: Negative for adenopathy. Does not bruise/bleed easily.   Psychiatric/Behavioral: Negative for confusion.       Physical Exam   BP: 127/54  Pulse: 83  Temp: 99.3  F (37.4  C)  Resp: 18  Height: 165.1 cm (5' 5\")  Weight: 129.7 kg (286 lb)  SpO2: 99 %      Physical Exam  Constitutional:       General: She is not in acute distress.     Appearance: She is not ill-appearing, toxic-appearing or diaphoretic.   HENT:      Head: No raccoon eyes or Sweet's sign.      Jaw: No trismus.      Right Ear: No drainage or tenderness.      Left Ear: No drainage or tenderness.      Nose: Nose normal.      Mouth/Throat:        Comments: Left lower posterior molar with a filling in place but appears to have a hole in the filling.  No localized gingival swelling but some warmth noted to her left side of her lower jaw.  No drainage.  Eyes:      General: No scleral icterus.     Extraocular Movements: Extraocular movements intact.      Right eye: Normal extraocular motion and no nystagmus.      Left eye: Normal extraocular motion and no nystagmus.      Pupils: Pupils are equal, round, and reactive to light.      Right eye: Pupil is reactive and not sluggish.      Left eye: Pupil is reactive and not sluggish.      Funduscopic exam:     Right eye: No AV nicking, arteriolar narrowing or papilledema. Red reflex present.         Left eye: No AV nicking, arteriolar narrowing or papilledema. Red reflex present.  Neck:      Musculoskeletal: Normal range of motion. Normal range of motion. No neck rigidity, pain with movement, spinous process tenderness or muscular tenderness.      Vascular: No JVD.      Trachea: No tracheal deviation.   Cardiovascular:      Rate and Rhythm: Normal rate and regular rhythm.   Pulmonary:      Effort: Pulmonary effort is normal. No respiratory distress.      Breath sounds: Normal breath sounds. No stridor. No wheezing.   Abdominal:      " General: There is no distension.      Palpations: There is no mass.      Tenderness: There is no abdominal tenderness. There is no right CVA tenderness, left CVA tenderness, guarding or rebound.   Musculoskeletal: Normal range of motion.         General: No tenderness or deformity.   Lymphadenopathy:      Cervical: No cervical adenopathy.      Right cervical: No superficial cervical adenopathy.     Left cervical: No superficial cervical adenopathy.   Skin:     General: Skin is warm and dry.      Capillary Refill: Capillary refill takes less than 2 seconds.   Neurological:      Mental Status: She is alert and oriented to person, place, and time.      GCS: GCS eye subscore is 4. GCS verbal subscore is 5. GCS motor subscore is 6.      Motor: No tremor or seizure activity.      Coordination: Coordination normal.      Gait: Gait normal.         ED Course     No results found for this or any previous visit (from the past 24 hour(s)).    Medications - No data to display    Assessments & Plan (with Medical Decision Making)     I have reviewed the nursing notes.    I have reviewed the findings, diagnosis, plan and need for follow up with the patient.      Discharge Medication List as of 10/18/2020  1:50 PM      START taking these medications    Details   clindamycin (CLEOCIN) 300 MG capsule Take 1 capsule (300 mg) by mouth 4 times daily for 10 days, Disp-40 capsule, R-0, Local Print             Final diagnoses:   Dental cavity   Abscessed tooth     Patient with left lower molar filling that appears to have a hole in it with dental abscess.  Thus causing some left lower jaw pain and warmth.  Rx for a course of clindamycin as well as Norco No. 6.  Follow-up with a dentist tomorrow for definitive treatment and evaluation.      10/18/2020   [facility]     Rocky Robledo PA-C  10/18/20 1726

## 2020-10-18 NOTE — ED AVS SNAPSHOT
[facility]  [address]  [place] [address]  Phone: [phone]  Fax: [phone]                                    Gabbie Tavares   MRN: [id number]    Department: [facility]   Date of Visit: 10/18/2020           After Visit Summary Signature Page    I have received my discharge instructions, and my questions have been answered. I have discussed any challenges I see with this plan with the nurse or doctor.    ..........................................................................................................................................  Patient/Patient Representative Signature      ..........................................................................................................................................  Patient Representative Print Name and Relationship to Patient    ..................................................               ................................................  Date                                   Time    ..........................................................................................................................................  Reviewed by Signature/Title    ...................................................              ..............................................  Date                                               Time          22EPIC Rev 08/18

## 2020-10-18 NOTE — ED TRIAGE NOTES
"Pt c/o left sided jaw pain, pt states [facility], dx with \"respiratory illness\". Pt denies any congestion or other symptoms. Pt was given Decadron which has helped at first, Now c/o increased pain and some tenderness in neck. Denies any poor dentation or tooth pain.   "

## 2020-10-19 ENCOUNTER — OFFICE VISIT (OUTPATIENT)
Dept: FAMILY MEDICINE | Facility: OTHER | Age: 30
End: 2020-10-19
Attending: FAMILY MEDICINE
Payer: COMMERCIAL

## 2020-10-19 VITALS
HEART RATE: 74 BPM | RESPIRATION RATE: 24 BRPM | WEIGHT: 293 LBS | TEMPERATURE: 97.3 F | BODY MASS INDEX: 49.69 KG/M2 | OXYGEN SATURATION: 98 % | DIASTOLIC BLOOD PRESSURE: 80 MMHG | SYSTOLIC BLOOD PRESSURE: 120 MMHG

## 2020-10-19 DIAGNOSIS — F51.01 PRIMARY INSOMNIA: ICD-10-CM

## 2020-10-19 DIAGNOSIS — R68.84 JAW PAIN: ICD-10-CM

## 2020-10-19 DIAGNOSIS — D63.8 ANEMIA IN OTHER CHRONIC DISEASES CLASSIFIED ELSEWHERE: Primary | ICD-10-CM

## 2020-10-19 PROCEDURE — 99214 OFFICE O/P EST MOD 30 MIN: CPT | Performed by: FAMILY MEDICINE

## 2020-10-19 PROCEDURE — G0463 HOSPITAL OUTPT CLINIC VISIT: HCPCS

## 2020-10-19 RX ORDER — TEMAZEPAM 15 MG/1
15 CAPSULE ORAL
Qty: 15 CAPSULE | Refills: 0 | Status: SHIPPED | OUTPATIENT
Start: 2020-10-19 | End: 2020-10-24

## 2020-10-19 RX ORDER — ETODOLAC 500 MG
500 TABLET ORAL 2 TIMES DAILY
Qty: 30 TABLET | Refills: 0 | Status: SHIPPED | OUTPATIENT
Start: 2020-10-19 | End: 2020-10-24

## 2020-10-19 RX ORDER — IBUPROFEN 200 MG
800 TABLET ORAL EVERY 6 HOURS PRN
COMMUNITY
End: 2020-10-20 | Stop reason: ALTCHOICE

## 2020-10-19 ASSESSMENT — ENCOUNTER SYMPTOMS
FEVER: 0
EYES NEGATIVE: 1
RESPIRATORY NEGATIVE: 1
PSYCHIATRIC NEGATIVE: 1
DIZZINESS: 0
CHILLS: 0

## 2020-10-19 ASSESSMENT — PAIN SCALES - GENERAL: PAINLEVEL: EXTREME PAIN (8)

## 2020-10-19 NOTE — PROGRESS NOTES
SUBJECTIVE:   Gabbie Tavares is a 30 year old female who presents to clinic today for the following health issues: Follow-up anemia    Patient arrives here for follow-up anemia.  And ER visit.  She has had multiple visits to the clinic in ER recently.  Her most recent visit was jaw pain.  Patient states this started about a month ago.  She recently did electrophoresis ruling out a CT scan in October 16.  Which was basically unremarkable except for some laryngitis.  She reports her jaw hurts.  She was seen in the ER given hydrocodone which she states helps her sleep.  Otherwise she will wake up frequently 7-8 times at night.  During her evaluation she is also had a decreased hemoglobin of 8.  She states that she has thalassemia.  Chart reviews indicates that she did rule out for beta thalassemia.  It was felt by hematology that she had alpha thalassemia which was not checked.  D report was generated in 2014.  She states that she is getting periodic transfusions somewhere between 7 and 8.  Recent hemoglobin was 8.        There are no active problems to display for this patient.    History reviewed. No pertinent past medical history.   History reviewed. No pertinent surgical history.  Current Outpatient Medications   Medication Sig Dispense Refill     clindamycin (CLEOCIN) 300 MG capsule Take 1 capsule (300 mg) by mouth 4 times daily for 10 days 40 capsule 0     dexamethasone (DECADRON) 4 MG tablet Take 1 tablet (4 mg) by mouth daily (with breakfast) for 4 days 4 tablet 0     etodolac (LODINE) 500 MG tablet Take 1 tablet (500 mg) by mouth 2 times daily 30 tablet 0     HYDROcodone-acetaminophen (NORCO) 5-325 MG tablet Take 2 tablets by mouth nightly as needed for severe pain 6 tablet 0     ibuprofen (ADVIL/MOTRIN) 200 MG tablet Take 800 mg by mouth every 6 hours as needed for mild pain       temazepam (RESTORIL) 15 MG capsule Take 1 capsule (15 mg) by mouth nightly as needed for sleep 15 capsule 0     Allergies    Allergen Reactions     Acetaminophen Nausea and Vomiting     Other reaction(s): Gastrointestinal  Mild per pt.     Latex Other (See Comments)     LATEX PRECAUTIONS due to hx of seasonal allergies and frequent exposure to latex     Penicillins Rash     PN: LW Reaction: rash/facial swelling       Review of Systems   Constitutional: Negative for chills and fever.   Eyes: Negative.    Respiratory: Negative.    Cardiovascular: Negative for chest pain.   Genitourinary: Negative.    Neurological: Negative for dizziness.   Psychiatric/Behavioral: Negative.         OBJECTIVE:     /80   Pulse 74   Temp 97.3  F (36.3  C)   Resp 24   Wt 135.4 kg (298 lb 9.6 oz)   LMP 10/15/2020   SpO2 98%   BMI 49.69 kg/m    Body mass index is 49.69 kg/m .  Physical Exam  Constitutional:       Appearance: Normal appearance. She is obese.   HENT:      Head: Normocephalic and atraumatic.      Comments: Patient does have pain on palpation to the left jaw mandible.  But no masses palpated throat was unremarkable.  No abscesses  Cardiovascular:      Rate and Rhythm: Normal rate and regular rhythm.      Heart sounds: No murmur.   Pulmonary:      Effort: Pulmonary effort is normal.         Diagnostic Test Results:  No results found for any visits on 10/19/20.    ASSESSMENT/PLAN:         1. Anemia in other chronic diseases classified elsewhere  Thalassemia per history.  Referral to hematology for further studies.  And recommendations  - Oncology/Hematology Adult Referral; Future    2. Primary insomnia  Start for insomnia  - temazepam (RESTORIL) 15 MG capsule; Take 1 capsule (15 mg) by mouth nightly as needed for sleep  Dispense: 15 capsule; Refill: 0    3. Jaw pain  Brief prescription for Lodine.  - etodolac (LODINE) 500 MG tablet; Take 1 tablet (500 mg) by mouth 2 times daily  Dispense: 30 tablet; Refill: 0      Greg Melvin MD  [facility]

## 2020-10-19 NOTE — NURSING NOTE
Patient here for ED follow up for mouth pain. Her teeth do not hurt its in her jaw. Her last dental exam 1 year prior. Medication Reconciliation: complete.    Roseanne Burris LPN  10/19/2020 3:31 PM

## 2020-10-20 ENCOUNTER — HOSPITAL ENCOUNTER (EMERGENCY)
Facility: OTHER | Age: 30
Discharge: HOME | End: 2020-10-20
Attending: PHYSICIAN ASSISTANT | Admitting: PHYSICIAN ASSISTANT
Payer: COMMERCIAL

## 2020-10-20 VITALS
TEMPERATURE: 98.1 F | HEART RATE: 69 BPM | OXYGEN SATURATION: 98 % | SYSTOLIC BLOOD PRESSURE: 113 MMHG | RESPIRATION RATE: 18 BRPM | DIASTOLIC BLOOD PRESSURE: 68 MMHG | WEIGHT: 286 LBS | BODY MASS INDEX: 47.59 KG/M2

## 2020-10-20 DIAGNOSIS — D56.8 OTHER THALASSEMIA (H): ICD-10-CM

## 2020-10-20 DIAGNOSIS — R68.84 PAIN IN LOWER JAW: ICD-10-CM

## 2020-10-20 DIAGNOSIS — K04.7 DENTAL ABSCESS: ICD-10-CM

## 2020-10-20 LAB
BASOPHILS # BLD AUTO: 0 10E9/L (ref 0–0.2)
BASOPHILS NFR BLD AUTO: 0.2 %
DIFFERENTIAL METHOD BLD: ABNORMAL
EOSINOPHIL # BLD AUTO: 0.2 10E9/L (ref 0–0.7)
EOSINOPHIL NFR BLD AUTO: 1.4 %
ERYTHROCYTE [DISTWIDTH] IN BLOOD BY AUTOMATED COUNT: 21 % (ref 10–15)
HCT VFR BLD AUTO: 31.6 % (ref 35–47)
HGB BLD-MCNC: 8.6 G/DL (ref 11.7–15.7)
IMM GRANULOCYTES # BLD: 0.2 10E9/L (ref 0–0.4)
IMM GRANULOCYTES NFR BLD: 1.7 %
INR PPP: 0.94 (ref 0.86–1.14)
LYMPHOCYTES # BLD AUTO: 3.5 10E9/L (ref 0.8–5.3)
LYMPHOCYTES NFR BLD AUTO: 29.3 %
MCH RBC QN AUTO: 17.1 PG (ref 26.5–33)
MCHC RBC AUTO-ENTMCNC: 27.2 G/DL (ref 31.5–36.5)
MCV RBC AUTO: 63 FL (ref 78–100)
MONOCYTES # BLD AUTO: 0.8 10E9/L (ref 0–1.3)
MONOCYTES NFR BLD AUTO: 6.5 %
NEUTROPHILS # BLD AUTO: 7.4 10E9/L (ref 1.6–8.3)
NEUTROPHILS NFR BLD AUTO: 60.9 %
PLATELET # BLD AUTO: 389 10E9/L (ref 150–450)
RBC # BLD AUTO: 5.02 10E12/L (ref 3.8–5.2)
WBC # BLD AUTO: 12.1 10E9/L (ref 4–11)

## 2020-10-20 PROCEDURE — 99283 EMERGENCY DEPT VISIT LOW MDM: CPT | Performed by: PHYSICIAN ASSISTANT

## 2020-10-20 PROCEDURE — 36415 COLL VENOUS BLD VENIPUNCTURE: CPT | Performed by: PHYSICIAN ASSISTANT

## 2020-10-20 PROCEDURE — 85610 PROTHROMBIN TIME: CPT | Performed by: PHYSICIAN ASSISTANT

## 2020-10-20 PROCEDURE — 99284 EMERGENCY DEPT VISIT MOD MDM: CPT | Performed by: PHYSICIAN ASSISTANT

## 2020-10-20 PROCEDURE — 85025 COMPLETE CBC W/AUTO DIFF WBC: CPT | Performed by: PHYSICIAN ASSISTANT

## 2020-10-20 PROCEDURE — 96372 THER/PROPH/DIAG INJ SC/IM: CPT | Performed by: PHYSICIAN ASSISTANT

## 2020-10-20 PROCEDURE — 250N000011 HC RX IP 250 OP 636: Performed by: PHYSICIAN ASSISTANT

## 2020-10-20 RX ORDER — OXYCODONE AND ACETAMINOPHEN 5; 325 MG/1; MG/1
1 TABLET ORAL EVERY 6 HOURS PRN
Qty: 6 TABLET | Refills: 0 | Status: SHIPPED | OUTPATIENT
Start: 2020-10-20 | End: 2020-10-24

## 2020-10-20 RX ORDER — KETOROLAC TROMETHAMINE 30 MG/ML
60 INJECTION, SOLUTION INTRAMUSCULAR; INTRAVENOUS ONCE
Status: COMPLETED | OUTPATIENT
Start: 2020-10-20 | End: 2020-10-20

## 2020-10-20 RX ORDER — IBUPROFEN 800 MG/1
800 TABLET, FILM COATED ORAL EVERY 8 HOURS PRN
Qty: 60 TABLET | Refills: 0 | Status: SHIPPED | OUTPATIENT
Start: 2020-10-20 | End: 2021-05-10

## 2020-10-20 RX ADMIN — KETOROLAC TROMETHAMINE 60 MG: 30 INJECTION, SOLUTION INTRAMUSCULAR at 14:32

## 2020-10-20 ASSESSMENT — ENCOUNTER SYMPTOMS
NAUSEA: 0
DYSURIA: 0
STRIDOR: 0
FREQUENCY: 0
COUGH: 0
DIARRHEA: 0
TROUBLE SWALLOWING: 0
APPETITE CHANGE: 0
CHEST TIGHTNESS: 0
SPEECH DIFFICULTY: 0
NECK STIFFNESS: 0
CONSTIPATION: 0
NECK PAIN: 0
WHEEZING: 0
TREMORS: 0
LIGHT-HEADEDNESS: 0
SORE THROAT: 0
BACK PAIN: 0
COLOR CHANGE: 0
HEADACHES: 0
ACTIVITY CHANGE: 0
VOMITING: 0
ABDOMINAL PAIN: 0
FATIGUE: 0
FACIAL ASYMMETRY: 0
WEAKNESS: 0
DIZZINESS: 0
RHINORRHEA: 0
SEIZURES: 0
FEVER: 0
FACIAL SWELLING: 0
EYE PAIN: 0
SHORTNESS OF BREATH: 0

## 2020-10-20 NOTE — ED AVS SNAPSHOT
[facility]  [address]  [place] [address]  Phone: [phone]  Fax: [phone]                                    Gabbie Tavares   MRN: [id number]    Department: [facility]   Date of Visit: 10/20/2020           After Visit Summary Signature Page    I have received my discharge instructions, and my questions have been answered. I have discussed any challenges I see with this plan with the nurse or doctor.    ..........................................................................................................................................  Patient/Patient Representative Signature      ..........................................................................................................................................  Patient Representative Print Name and Relationship to Patient    ..................................................               ................................................  Date                                   Time    ..........................................................................................................................................  Reviewed by Signature/Title    ...................................................              ..............................................  Date                                               Time          22EPIC Rev 08/18

## 2020-10-20 NOTE — ED TRIAGE NOTES
"Pt presents to the Ed with left Jaw, tooth pain. It started last Thursday and pt has been seen 6 visit between the clinic, [facility], and Ed.  Pt states \"My tooth has moved and unable to eat\". Pt has been taking prescribed medications and pain meds have been not helping.    "

## 2020-10-20 NOTE — ED PROVIDER NOTES
History     Chief Complaint   Patient presents with     Jaw Pain     HPI  Gabbie Tavares is a 30 year old female who was seen 2 days ago due to left lower jaw pain.  At the time it was noted that she had a feeling there that had a hole in it and most likely a dental abscess with warmth to her jaw area.  She was started on clindamycin and given a short course of Norco and Motrin.  She is here today reporting that she tried calling dentist in the 800 mile radius with no success is getting an appointment.  Furthermore she said most of them do not accept her insurance.  She continues to have pain and is here for further evaluation at this time.  Furthermore she has known  thalassemia, and the last time she had her hemoglobin drawn 4 days ago it was 8.0.    Allergies:  Allergies   Allergen Reactions     Acetaminophen Nausea and Vomiting     Other reaction(s): Gastrointestinal  Mild per pt.     Latex Other (See Comments)     LATEX PRECAUTIONS due to hx of seasonal allergies and frequent exposure to latex     Penicillins Rash     PN: LW Reaction: rash/facial swelling       Problem List:    There are no active problems to display for this patient.       Past Medical History:    No past medical history on file.    Past Surgical History:    No past surgical history on file.    Family History:    No family history on file.    Social History:  Marital Status:  Single [1]  Social History     Tobacco Use     Smoking status: Current Every Day Smoker     Packs/day: 0.50     Smokeless tobacco: Never Used   Substance Use Topics     Alcohol use: Not Currently     Drug use: No        Medications:         clindamycin (CLEOCIN) 300 MG capsule       dexamethasone (DECADRON) 4 MG tablet       etodolac (LODINE) 500 MG tablet       HYDROcodone-acetaminophen (NORCO) 5-325 MG tablet       temazepam (RESTORIL) 15 MG capsule       ibuprofen (ADVIL/MOTRIN) 200 MG tablet          Review of Systems   Constitutional: Negative for activity  change, appetite change, fatigue and fever.   HENT: Positive for dental problem. Negative for drooling, facial swelling, rhinorrhea, sore throat and trouble swallowing.    Eyes: Negative for pain and visual disturbance.   Respiratory: Negative for cough, chest tightness, shortness of breath, wheezing and stridor.    Cardiovascular: Negative for chest pain and leg swelling.   Gastrointestinal: Negative for abdominal pain, constipation, diarrhea, nausea and vomiting.   Genitourinary: Negative for dysuria, frequency and urgency.   Musculoskeletal: Negative for back pain, neck pain and neck stiffness.   Skin: Negative for color change.   Neurological: Negative for dizziness, tremors, seizures, facial asymmetry, speech difficulty, weakness, light-headedness and headaches.       Physical Exam   BP: 136/66  Pulse: 76  Temp: 98.4  F (36.9  C)  Resp: 18  Weight: 129.7 kg (286 lb)  SpO2: 100 %      Physical Exam  Constitutional:       General: She is not in acute distress.     Appearance: She is not ill-appearing, toxic-appearing or diaphoretic.   HENT:      Head: No raccoon eyes or Sweet's sign.      Jaw: No trismus.      Right Ear: No drainage or tenderness.      Left Ear: No drainage or tenderness.      Nose: Nose normal.      Mouth/Throat:        Comments: Continues to have a filling that has a hole in it.  No obvious drainage.  Pain to the left lower posterior molar.  Eyes:      General: No scleral icterus.     Extraocular Movements: Extraocular movements intact.      Right eye: Normal extraocular motion and no nystagmus.      Left eye: Normal extraocular motion and no nystagmus.      Pupils: Pupils are equal, round, and reactive to light.      Right eye: Pupil is reactive and not sluggish.      Left eye: Pupil is reactive and not sluggish.      Funduscopic exam:     Right eye: No AV nicking, arteriolar narrowing or papilledema. Red reflex present.         Left eye: No AV nicking, arteriolar narrowing or papilledema.  Red reflex present.  Neck:      Musculoskeletal: Normal range of motion. Normal range of motion. No neck rigidity, pain with movement, spinous process tenderness or muscular tenderness.      Vascular: No JVD.      Trachea: No tracheal deviation.   Cardiovascular:      Rate and Rhythm: Normal rate and regular rhythm.   Pulmonary:      Effort: Pulmonary effort is normal. No respiratory distress.      Breath sounds: Normal breath sounds. No stridor. No wheezing.   Abdominal:      General: There is no distension.      Palpations: There is no mass.      Tenderness: There is no abdominal tenderness. There is no right CVA tenderness, left CVA tenderness, guarding or rebound.   Musculoskeletal: Normal range of motion.         General: No tenderness or deformity.   Lymphadenopathy:      Cervical: No cervical adenopathy.      Right cervical: No superficial cervical adenopathy.     Left cervical: No superficial cervical adenopathy.   Skin:     General: Skin is warm and dry.      Capillary Refill: Capillary refill takes less than 2 seconds.   Neurological:      Mental Status: She is alert and oriented to person, place, and time.      GCS: GCS eye subscore is 4. GCS verbal subscore is 5. GCS motor subscore is 6.      Motor: No tremor or seizure activity.      Coordination: Coordination normal.      Gait: Gait normal.         ED Course     Results for orders placed or performed during the hospital encounter of 10/20/20 (from the past 24 hour(s))   CBC with platelets differential   Result Value Ref Range    WBC 12.1 (H) 4.0 - 11.0 10e9/L    RBC Count 5.02 3.8 - 5.2 10e12/L    Hemoglobin 8.6 (L) 11.7 - 15.7 g/dL    Hematocrit 31.6 (L) 35.0 - 47.0 %    MCV 63 (L) 78 - 100 fl    MCH 17.1 (L) 26.5 - 33.0 pg    MCHC 27.2 (L) 31.5 - 36.5 g/dL    RDW 21.0 (H) 10.0 - 15.0 %    Platelet Count 389 150 - 450 10e9/L    Diff Method Automated Method     % Neutrophils 60.9 %    % Lymphocytes 29.3 %    % Monocytes 6.5 %    % Eosinophils 1.4 %     % Basophils 0.2 %    % Immature Granulocytes 1.7 %    Absolute Neutrophil 7.4 1.6 - 8.3 10e9/L    Absolute Lymphocytes 3.5 0.8 - 5.3 10e9/L    Absolute Monocytes 0.8 0.0 - 1.3 10e9/L    Absolute Eosinophils 0.2 0.0 - 0.7 10e9/L    Absolute Basophils 0.0 0.0 - 0.2 10e9/L    Abs Immature Granulocytes 0.2 0 - 0.4 10e9/L   INR   Result Value Ref Range    INR 0.94 0.86 - 1.14       Medications   ketorolac (TORADOL) injection 60 mg (60 mg Intramuscular Given 10/20/20 1432)       Assessments & Plan (with Medical Decision Making)     I have reviewed the nursing notes.    I have reviewed the findings, diagnosis, plan and need for follow up with the patient.      New Prescriptions    IBUPROFEN (ADVIL/MOTRIN) 800 MG TABLET    Take 1 tablet (800 mg) by mouth every 8 hours as needed for moderate pain    OXYCODONE-ACETAMINOPHEN (PERCOCET) 5-325 MG TABLET    Take 1 tablet by mouth every 6 hours as needed for severe pain       Final diagnoses:   Dental abscess   Pain in lower jaw   Other thalassemia (H)     Afebrile.  Vital signs stable.  Patient with left lower dental filling with a crack and a dental abscess.  Currently on clindamycin.  She has not been able to get into see a dentist.  History of thalassemia.  Hemoglobin today has actually improved to 8.6.  The patient was given Toradol IM in the ER.  I encouraged her to follow-up with Dr. Roman TEJADA for definitive treatment.  Furthermore if she is having insurance a few she was encouraged to call [facility] for assistance.  Rx for short course of Motrin and Percocet No. 6.  10/20/2020   [facility]     Rocky Robledo PA-C  10/20/20 [id number]

## 2020-10-24 ENCOUNTER — OFFICE VISIT (OUTPATIENT)
Dept: FAMILY MEDICINE | Facility: OTHER | Age: 30
End: 2020-10-24
Attending: FAMILY MEDICINE
Payer: COMMERCIAL

## 2020-10-24 VITALS
BODY MASS INDEX: 49.29 KG/M2 | OXYGEN SATURATION: 99 % | RESPIRATION RATE: 16 BRPM | HEART RATE: 91 BPM | TEMPERATURE: 97.2 F | DIASTOLIC BLOOD PRESSURE: 80 MMHG | SYSTOLIC BLOOD PRESSURE: 126 MMHG | WEIGHT: 293 LBS

## 2020-10-24 DIAGNOSIS — B37.0 THRUSH: Primary | ICD-10-CM

## 2020-10-24 PROCEDURE — 99213 OFFICE O/P EST LOW 20 MIN: CPT | Performed by: FAMILY MEDICINE

## 2020-10-24 PROCEDURE — G0463 HOSPITAL OUTPT CLINIC VISIT: HCPCS

## 2020-10-24 RX ORDER — NYSTATIN 100000/ML
500000 SUSPENSION, ORAL (FINAL DOSE FORM) ORAL 4 TIMES DAILY
Qty: 140 ML | Refills: 0 | Status: SHIPPED | OUTPATIENT
Start: 2020-10-24 | End: 2020-10-31

## 2020-10-24 ASSESSMENT — ENCOUNTER SYMPTOMS
SORE THROAT: 1
COUGH: 0
SHORTNESS OF BREATH: 0
FATIGUE: 0
FEVER: 0

## 2020-10-24 ASSESSMENT — PAIN SCALES - GENERAL: PAINLEVEL: MILD PAIN (3)

## 2020-10-25 NOTE — PROGRESS NOTES
SUBJECTIVE:   Gabbie Tavares is a 30 year old female who presents to clinic today for the following health issues:    HPI  Thrush?  NO fevers, jaw pain is now gone.  Tongue burning for 1 day, and today had swelling and redness on her uvula.  Recent antibiotics for a tooth abscess.  Tooth was pulled.    History reviewed. No pertinent past medical history.   History reviewed. No pertinent surgical history.  Social History     Tobacco Use     Smoking status: Current Every Day Smoker     Packs/day: 0.50     Smokeless tobacco: Never Used   Substance Use Topics     Alcohol use: Not Currently     Current Outpatient Medications   Medication Sig Dispense Refill     clindamycin (CLEOCIN) 300 MG capsule Take 1 capsule (300 mg) by mouth 4 times daily for 10 days 40 capsule 0     ibuprofen (ADVIL/MOTRIN) 800 MG tablet Take 1 tablet (800 mg) by mouth every 8 hours as needed for moderate pain 60 tablet 0     Allergies   Allergen Reactions     Acetaminophen Nausea and Vomiting     Other reaction(s): Gastrointestinal  Mild per pt.     Latex Other (See Comments)     LATEX PRECAUTIONS due to hx of seasonal allergies and frequent exposure to latex     Penicillins Rash     PN: LW Reaction: rash/facial swelling       Review of Systems   Constitutional: Negative for fatigue and fever.   HENT: Positive for sore throat.    Respiratory: Negative for cough and shortness of breath.         OBJECTIVE:     /80   Pulse 91   Temp 97.2  F (36.2  C) (Temporal)   Resp 16   Wt 134.4 kg (296 lb 3.2 oz)   LMP 10/15/2020   SpO2 99%   Breastfeeding No   BMI 49.29 kg/m    Body mass index is 49.29 kg/m .  Physical Exam  Constitutional:       Appearance: Normal appearance.   HENT:      Mouth/Throat:      Mouth: Mucous membranes are moist.      Comments: Mild erythema of uvula.  White plaque on tongue.  Neurological:      Mental Status: She is alert.             ASSESSMENT/PLAN:         (B37.0) Thrush  (primary encounter  diagnosis)  Comment: presumed form the antibiotics   Plan: nystatin (MYCOSTATIN) 469950 UNIT/ML suspension        Qid for 1 week.  Follow up as needed         Fred Burns MD  [facility]

## 2020-10-25 NOTE — NURSING NOTE
"Chief Complaint   Patient presents with     Oral Swelling     tongue burning since yesterday   She has been on antibiotic for a week for a jaw infection.. Her tongue started burning yesterday.    Initial /80   Pulse 91   Temp 97.2  F (36.2  C) (Temporal)   Resp 16   Wt 134.4 kg (296 lb 3.2 oz)   LMP 10/15/2020   SpO2 99%   Breastfeeding No   BMI 49.29 kg/m   Estimated body mass index is 49.29 kg/m  as calculated from the following:    Height as of 10/18/20: 1.651 m (5' 5\").    Weight as of this encounter: 134.4 kg (296 lb 3.2 oz).  Medication Reconciliation: complete    Jannet Dye LPN  "

## 2021-01-03 ENCOUNTER — HEALTH MAINTENANCE LETTER (OUTPATIENT)
Age: 31
End: 2021-01-03

## 2021-04-25 ENCOUNTER — HEALTH MAINTENANCE LETTER (OUTPATIENT)
Age: 31
End: 2021-04-25

## 2021-05-10 ENCOUNTER — MYC MEDICAL ADVICE (OUTPATIENT)
Dept: FAMILY MEDICINE | Facility: OTHER | Age: 31
End: 2021-05-10

## 2021-05-10 ENCOUNTER — OFFICE VISIT (OUTPATIENT)
Dept: FAMILY MEDICINE | Facility: OTHER | Age: 31
End: 2021-05-10
Attending: PHYSICIAN ASSISTANT
Payer: COMMERCIAL

## 2021-05-10 VITALS
HEART RATE: 84 BPM | SYSTOLIC BLOOD PRESSURE: 136 MMHG | TEMPERATURE: 98.3 F | DIASTOLIC BLOOD PRESSURE: 86 MMHG | BODY MASS INDEX: 45.99 KG/M2 | RESPIRATION RATE: 20 BRPM | WEIGHT: 293 LBS | HEIGHT: 67 IN

## 2021-05-10 DIAGNOSIS — Z00.00 ROUTINE HISTORY AND PHYSICAL EXAMINATION OF ADULT: Primary | ICD-10-CM

## 2021-05-10 DIAGNOSIS — Z11.3 SCREEN FOR STD (SEXUALLY TRANSMITTED DISEASE): ICD-10-CM

## 2021-05-10 DIAGNOSIS — N89.8 VAGINAL DISCHARGE: ICD-10-CM

## 2021-05-10 DIAGNOSIS — N91.2 AMENORRHEA: ICD-10-CM

## 2021-05-10 DIAGNOSIS — R10.2 PELVIC CRAMPING: Primary | ICD-10-CM

## 2021-05-10 DIAGNOSIS — E66.01 MORBID OBESITY (H): ICD-10-CM

## 2021-05-10 DIAGNOSIS — Z01.419 PAP TEST, AS PART OF ROUTINE GYNECOLOGICAL EXAMINATION: ICD-10-CM

## 2021-05-10 LAB
C TRACH DNA SPEC QL NAA+PROBE: NOT DETECTED
HCG UR QL: NEGATIVE
N GONORRHOEA DNA SPEC QL NAA+PROBE: NOT DETECTED
SPECIMEN SOURCE: NORMAL
SPECIMEN SOURCE: NORMAL
WET PREP SPEC: NORMAL

## 2021-05-10 PROCEDURE — 87491 CHLMYD TRACH DNA AMP PROBE: CPT | Mod: ZL | Performed by: PHYSICIAN ASSISTANT

## 2021-05-10 PROCEDURE — 81025 URINE PREGNANCY TEST: CPT | Mod: ZL | Performed by: PHYSICIAN ASSISTANT

## 2021-05-10 PROCEDURE — 87210 SMEAR WET MOUNT SALINE/INK: CPT | Mod: ZL | Performed by: PHYSICIAN ASSISTANT

## 2021-05-10 PROCEDURE — 87624 HPV HI-RISK TYP POOLED RSLT: CPT | Mod: ZL | Performed by: PHYSICIAN ASSISTANT

## 2021-05-10 PROCEDURE — 99395 PREV VISIT EST AGE 18-39: CPT | Performed by: PHYSICIAN ASSISTANT

## 2021-05-10 PROCEDURE — G0123 SCREEN CERV/VAG THIN LAYER: HCPCS | Performed by: PHYSICIAN ASSISTANT

## 2021-05-10 PROCEDURE — 87591 N.GONORRHOEAE DNA AMP PROB: CPT | Mod: ZL | Performed by: PHYSICIAN ASSISTANT

## 2021-05-10 ASSESSMENT — MIFFLIN-ST. JEOR: SCORE: 2153.78

## 2021-05-10 NOTE — NURSING NOTE
Chief Complaint   Patient presents with     Physical         Medication Reconciliation: complete    Caterina Lockwood, LPN

## 2021-05-10 NOTE — PATIENT INSTRUCTIONS
"Please call to schedule a nurse only appointment to obtain your hepatitis B #3 vaccine.    Healthy Strategies  1. Eat at least 3 meals a day and never skip breakfast.  2. Eat more slowly.  3. Decrease portion size.  4. Provide structure by using meal replacement bars or shakes, and/or low calorie frozen meals.  5. For good nutrition incorporate fruit, vegetables, whole grains, lean protein, and low-fat dairy.  6. Remove trigger foods from yourenvironment to avoid impulse eating.  7. Increase physical activity: get a pedometer and aim for 10,000 steps a day or 30-35 minutes of activity 5 days per week.  8. Weigh yourself daily or at least weekly.  9. Keep a record of what you eat and your activity.  10. Establish a support system such as afriend, group or program.    11. Read [employer]'s \"Eat to Live\". Remember it is important to have a minimum of 1200 calories a day, okay to use olive oil, 40 grams of fiber daily. No more than two servings (the size of your palm) of red meat a week.     Please consider the following general health recommendations:    Eat a quality diet (generally, low in simple sugars, starches, cholesterol and saturated fat.)    Please get 1200 mg of calcium in divided doses with 800 units vitamin D in your diet daily. Take supplements as needed to obtain full recommended amounts.     Stay physically active. Regular walking or other exercise is one of the best ways to minimize pain of arthritis; maintain independence and mobility; maintain bone strength; maintain conditioning of your heart. Find something you enjoy and a friend to do it with you.    Maintain ideal weight. Your Body mass index is Body mass index is 48.55 kg/m .. Generally a BMI of 20-25 is considered ideal. Overweight is defined as 25-30, obese is 30-35 and markedly obese is greater than 35.    Apply sun block (SPF 25 or greater) on exposed skin anytime you are out in the sun to prevent skin cancer.     Wear a seatbelt whenever " you are in a car.    Obtain a flu shot every fall.    You should have a tetanus booster at least once every 10 years.    Check blood sugar annually. Cholesterol annually unless you have had a normal level when last checked within 5 years.     I recommend that you have a general physical exam every year. You should have a pap test every 3 years between the ages of 21 and 30 and every 3-5 years between the ages of 30 and 65 depending on your test unless you have had previous abnormal pap smears, (in these cases the exams and PAP's should be done on a schedule as recommended by your primary care provider). If you have had hysterectomy in the past, your future Pap plan may be different.       Patient Education     Eating a Low-fiber Diet  What is fiber?  Fiber is the part of food that the body cannot digest. It helps form stools (bowel movements).   If you eat less fiber, you may:    Reduce belly pain, diarrhea (loose, watery stools) and other digestive problems    Have fewer and smaller stools    Decrease inflammation (pain, redness and swelling) in the GI (gastro-intestinal) tract    Promote healing in the GI tract.  For a list of foods allowed in a low-fiber diet, see the back of this page.  Why might I need a low-fiber diet?  You may need a low-fiber diet if you have:     Inflamed bowels    Crohn's disease    Diverticular disease    Ulcerative colitis    Radiation therapy to the belly area    Chemotherapy    An upcoming colonoscopy    Surgery on your intestines or in the belly area.  Sample Menu  Breakfast:  1 scrambled egg  1 slice white toast with 1 teaspoon margarine    cup Cream of Wheat with sugar    cup milk     cup pulp-free orange juice  Snack:    cup canned fruit cocktail (in juice)  6 saltine crackers  Lunch:  Tuna sandwich on white bread   1 cup cream of chicken soup    cup canned peaches (in light syrup)  1 cup lemonade  Snack:    cup cottage cheese  1 medium apple, sliced and peeled  Dinner:  3 ounces  "well-cooked chicken breast  1 cup white rice    cup cooked canned carrots  1 white dinner roll with 1 teaspoon margarine  1 slice shelton food cake  1 cup herbal tea  Food group Allowed Avoid   Grains Foods that contain refined white flour   (1 gram fiber or less per serving), such as bread, pasta, muffins, cereals, crackers, etc.; white rice; Cream of Wheat; Cream of Rice Whole grains (whole wheat bread, oatmeal, barley, brown or wild rice); foods containing nuts, seeds or bran   Vegetables Canned or well-cooked vegetables; mashed potatoes; non-gas-forming vegetables; vegetables without skin, seeds or pulp; vegetable juice ( 1/2 cup per day or less) Raw vegetables; cooked greens or spinach;   gas-forming vegetables (broccoli, cauliflower, brussels sprouts)   Fruits Peeled fresh fruit (bananas, apples, melons, nectarines); canned fruit (in juice or light syrup); fruit juice without pulp Dried fruit; fruit with pulp (oranges, grapefruit, pineapple); unpeeled fruit; prune juice   Meats and   other proteins Tender, well-cooked or ground meats; fish; eggs; tofu; smooth nut butters (peanut, soy, almond, sunflower) Crunchy nut butters; tough meats; meats with gristle (maldonado, sausage); dried beans or peas (legumes)   Milk products Milk, soy milk, rice milk, almond milk, coconut milk; yogurt, soy yogurt; cottage cheese, mild cheese; ice cream, sherbet If you are lactose intolerant: avoid milk, dairy products and foods made with milk  Note: Some people become lactose intolerant after surgery. This may or may not improve over time.   Other Salad dressings; oil, butter, margarine; jelly, honey, syrup Any food containing nuts or seeds; coconut; marmalade; carbonated (\"fizzy\") drinks   For informational purposes only. Not to replace the advice of your health care provider.   Copyright   2007 [facility]. All rights reserved. [employer] [id number] - REV 09/15.  For informational purposes only. Not to replace the advice of " your health care provider.  Copyright   2018 [facility]. All rights reserved.           Patient Education     Eating Heart-Healthy Foods  Eating has a big impact on your heart health. In fact, eating healthier can improve several of your heart risks at once. For instance, it helps you manage weight, cholesterol, and blood pressure. Here are ideas to help you make heart-healthy changes without giving up all the foods and flavors you love.   Getting started    Talk with your healthcare provider about eating plans, such as the DASH or Mediterranean diet. You may also be referred to a dietitian.    Change a few things at a time. Give yourself time to get used to a few eating changes before adding more.    Work to create a tasty, healthy eating plan that you can stick to for the rest of your life.    Goals for healthy eating  Below are some tips to improve your eating habits:     Limit saturated fats and trans fats. Saturated fats raise your levels of cholesterol, so keep these fats to a minimum. They are found in foods such as fatty meats, whole milk, cheese, and palm and coconut oils. Avoid trans fats because they lower good cholesterol as well as raise bad cholesterol. Trans fats are most often found in processed foods, such as pastries, cookies, pies, muffins, fried foods, stick margarines, and shortening.    Reduce how much sodium (salt) you have. Eating too much salt may increase your blood pressure. Limit your sodium intake to 2,300 milligrams (mg) per day (the amount in 1 teaspoon of salt), or less if your healthcare provider recommends it. Dining out less often and eating fewer processed foods are two great ways to decrease the amount of salt you consume. At home, flavor your foods with other spices and herbs instead of salt.    Managing calories. A calorie is a unit of energy. Your body burns calories for fuel, but if you eat more calories than your body burns, the extras are stored as fat. Your  healthcare provider can help you create a diet plan to manage your calories. This will likely include eating healthier foods and getting regular exercise. To help you track your progress, keep a diary to record what you eat and how often you exercise.  Choose the right foods  Aim to make these foods staples of your diet. If you have diabetes, you may have different recommendations than what is listed here:     Fruits and vegetables provide plenty of nutrients without a lot of calories. At meals, fill half your plate with these foods. Choose between fresh, frozen, canned, or dried without added sauces, salt, or sugars. Split the other half of your plate between whole grains and lean protein.    Whole grains are high in fiber and rich in vitamins and nutrients. Good choices include whole wheat bread, pasta, oats, and brown rice. Make at least half of your grains whole grains.    Lean proteins give you nutrition with less fat. Good choices include fish, skinless chicken and turkey, and beans. Draining the fat from cooked ground meat is another way to reduce the amount of fat you eat.    Low-fat and nonfat dairy provide nutrients without a lot of fat. Try low-fat or nonfat milk, cheese, or yogurt.    Healthy fats can be good for you in small amounts. These are unsaturated fats, such as olive oil, nuts, and fish. Try to have at least 2 servings per week of fatty fish, such as salmon, sardines, mackerel, rainbow trout, and albacore tuna. These contain omega-3 fatty acids, which are good for your heart. Flaxseed and walnuts are other sources of heart-healthy fats.  More on heart-healthy eating  Read food labels  Healthy eating starts at the grocery store. Be sure to pay attention to food labels on packaged foods. Look for products that are high in fiber and protein, and low in saturated fat, added sugars, and sodium. Avoid products that contain trans fat. And pay close attention to serving size. For instance, if you plan  to eat two servings, double all the numbers on the label.   Prepare food right  A key part of healthy cooking is cutting down on added fat, sugar and salt. Look on the internet for lower-fat, lower-sodium recipes without a lot of added sugars. Also try these tips:     Remove fat from meat and skin from poultry before cooking.    Skim fat from the surface of soups and sauces.    Broil, roast, boil, bake, steam, grill, or microwave food without added fats.    Choose ingredients that spice up your food without adding calories, fat, sugar, or sodium. Try these items: horseradish, hot sauce, lemon, mustard, nonfat salad dressings, and vinegar. Small amounts of olive oil-based vinaigrettes are OK, too. For salt-free herbs and spices, try basil, cilantro, cinnamon, cumin, paprika, pepper, and rosemary.  [employer] last reviewed this educational content on 7/1/2020 2000-2021 [employer]. All rights reserved. This information is not intended as a substitute for professional medical care. Always follow your healthcare professional's instructions.           Patient Education     Healthy Eating on the Go    Wherever your family goes, healthy eating can still be easy for you and fun for your kids. Pack sliced vegetables, fruits, and nonfat or low-fat dips in plastic bags or containers. Make fun and easy snacks like celery with peanut butter and raisins. Bring plenty of bottled water. Anywhere you go, you'll be ready when you and your child feel hungry.  At a fast-food restaurant  Eating healthy at fast-food restaurants means choosing the right foods.    Instead of fried foods, try grilled meats like chicken and fish. Look for baked potatoes topped with vegetables or salads. Order low-fat or nonfat milk instead of soda. Get fruit or yogurt instead of milkshakes or cookies.    If your child isn't ready for you to stop ordering French fries, get one serving for everyone to share. This gives everyone a taste without  "making French fries the center of the meal.    Lead by example. Make healthy choices for yourself. Kids watch how and what you eat. They'll be more likely to eat healthy foods that you eat, too.  At the store  Do you shop at corner markets or convenience stores? You can still find healthy foods for your family. Follow these tips:    Buy canned vegetables and fruits packed in water or fruit juice. Don't buy those packed in heavy syrup. If you have to buy fruit packed in syrup, rinse the fruit with water and throw away the syrup.    Choose whole-grain products such as brown rice, corn tortillas, and whole-wheat breads. Look for the words \"whole grain\" on the package, not just \"wheat.\"    Find sources of protein such as canned beans, tuna canned in water, eggs, low-fat or nonfat milk, and low-fat or nonfat cheese and yogurt.    Stay away from the snack foods! Don't be drawn in by all the chips, candy, soda, and sugar-filled cereals.  [employer] last reviewed this educational content on 6/1/2020 2000-2021 [employer]. All rights reserved. This information is not intended as a substitute for professional medical care. Always follow your healthcare professional's instructions.           "

## 2021-05-10 NOTE — PROGRESS NOTES
Nursing Notes:   Caterina Lockwood LPN  5/10/2021  9:45 AM  Signed  Chief Complaint   Patient presents with     Physical         Medication Reconciliation: complete    Caterina Lockwood LPN        ANNUAL PHYSICAL - FEMALE    HPI: Gabbie L Chaitanya who presents for a yearly exam.  Concerns include: Patient is concerned that she has had no menstrual cycle over the last couple months.  Negative home pregnancy test.  Last took a pregnancy test approximately 3 to 4 weeks ago.  Not try to get pregnant.  History of birth control pills in the past.  She has had irregular periods over the last couple years.  Quit drinking caffeine a couple years ago which helped make her menstrual cycle regular.  She has noticed an increased amount of vaginal discharge.  Had a little streaking blood a few days ago.  She has felt pelvic cramping over the last 1 month.  Having some clear discharge.  No rashes.  No STD concerns.    Patient's last menstrual period was 02/10/2021.   Contraception: none  Risk for STI?: no concerns  Last pap: 3 years ago - 2018, normal, needs to be repeated  2015 - normal pap  3/6/2014-  LSIL  Any hx of abnormal paps: yes  FH of early CA?: none  Cholesterol/DM concerns/screenin  Tobacco?: yes  Calcium intake: no  DEXA: na  Last mammo: na  Colonoscopy: na  Immunizations: declines hep b #3 today    Patient Active Problem List    Diagnosis Date Noted     Morbid obesity (H) 05/10/2021     Priority: Medium       Past Medical History:   Diagnosis Date     No pertinent past medical history        Past Surgical History:   Procedure Laterality Date     GALLBLADDER SURGERY       [facility]ly [facility], later [facility]         Family History   Problem Relation Age of Onset     No Known Problems Mother      No Known Problems Father      No Known Problems Sister      No Known Problems Brother      No Known Problems Brother      No Known Problems Brother      No Known Problems Brother      No Known Problems  "Brother      No Known Problems Brother      No Known Problems Sister      No Known Problems Sister      No Known Problems Son      No Known Problems Daughter        Social History     Tobacco Use     Smoking status: Current Every Day Smoker     Packs/day: 0.50     Smokeless tobacco: Never Used   Substance Use Topics     Alcohol use: Not Currently       No current outpatient medications on file.       Allergies   Allergen Reactions     Acetaminophen Nausea and Vomiting     Other reaction(s): Gastrointestinal  Mild per pt.     Lanolin      Latex Other (See Comments)     LATEX PRECAUTIONS due to hx of seasonal allergies and frequent exposure to latex     Penicillins Rash     PN: LW Reaction: rash/facial swelling       REVIEW OF SYSTEMS:  Refer to HPI.    PHYSICAL EXAM:  /86 (BP Location: Right arm, Patient Position: Sitting, Cuff Size: Adult Large)   Pulse 84   Temp 98.3  F (36.8  C)   Resp 20   Ht 1.702 m (5' 7\")   Wt 140.6 kg (310 lb)   LMP 02/10/2021   BMI 48.55 kg/m    CONSTITUTIONAL:  Alert,cooperative, NAD.  EYES: No scleral icterus.  PERRLA.  Conjunctiva clear.  ENT/MOUTH: External ears and nose normal.  TMs normal.  Moist mucous membranes. Oropharynx clear.    ENDO: No thyromegaly or thyroidnodules.  LYMPH:  No cervical or supraclavicular LA.    BREASTS: No skin abnormalities, no erythema.  No discrete masses.  No nipple discharge, no axillary, supra- or infraclavicular LA.   CARDIOVASCULAR: Regular,S1, S2.  No S3 or S4.  No murmur/gallop/rub.  No peripheral edema.  RESPIRATORY: CTA bilaterally, no wheezes, rhonchi or rales.  GI: Bowel sounds wnl.  Soft, nontender, nondistended.  No masses or HSM.  No rebound or guarding.  : Vulva: normal, no lesions or discharge  Urethral meatus: normal size and location, no lesions or discharge  Urethra: no tenderness or masses  Bladder: no fullness or tenderness  Vagina: normal appearance, no abnormal discharge, no lesions.  No evidence of cystocele or " rectocele.  Cervix: normal appearance, no lesions, no abnormal discharge, no cervical motion tenderness  Uterus: normal size and position, mobile, non-tender  Adnexa: no palpable masses bilaterally. No cervical motion tenderness.  Pap smear obtained: yes  MSKEL: Grossly normal ROM.  No clubbing.  INTEGUMENTARY:  Warm, dry.  No rash noted on exposed skin.  NEUROLOGIC: Facies symmetric.  Grossly normal movement and tone.  No tremor.  PSYCHIATRIC: Affect normal.  Speech fluent.      PHQ Depression Screen  No flowsheet data found.    Labs:  Results for orders placed or performed in visit on 05/10/21   Pregnancy, Urine (HCG)     Status: None   Result Value Ref Range    HCG Qual Urine Negative NEG^Negative   Wet Prep, Genital     Status: None    Specimen: Cervical   Result Value Ref Range    Specimen Description Cervical     Wet Prep No Trichomonas seen     Wet Prep No clue cells seen     Wet Prep No yeast seen    GC/Chlamydia by PCR     Status: None    Specimen: Endocervical Swab   Result Value Ref Range    Specimen Source Cervical     Neisseria gonorrhoreae PCR Not Detected NDET^Not Detected    Chlamydia Trachomatis PCR Not Detected NDET^Not Detected       ASSESSMENT AND PLAN:      ICD-10-CM    1. Routine history and physical examination of adult  Z00.00    2. Pap test, as part of routine gynecological examination  Z01.419 HPV High Risk Types DNA Cervical     Pap Screen Thin Prep with HPV - recommended age 30 - 65 years (select HPV order below)   3. Screen for STD (sexually transmitted disease)  Z11.3 Wet Prep, Genital     GC/Chlamydia by PCR   4. Vaginal discharge  N89.8 Wet Prep, Genital     GC/Chlamydia by PCR   5. Amenorrhea  N91.2 Pregnancy, Urine (HCG)   6. Morbid obesity (H)  E66.01      Offered hepatitis B vaccine #3 today however she declined at this time.  Please call to schedule a nurse only appointment to obtain your hepatitis B #3 vaccine.    Morbid obesity: Encourage good diet, exercise, and losing weight  "to decrease future health risk.    Completed vaginal wet prep, gonorrhea, and chlamydia test for STD screen.  Labs are negative.    Amenorrhea: Completed urinary pregnancy test which was negative.  Ordered pelvic ultrasound to rule out concerns.  May need to refer to OB/GYN for consult if patient symptoms are persistent or worsening.    Completed Pap and HPV for cervical cancer screening.    Relevant cancer screening discussed.    Counseled on healthy diet, Calcium and vitamin D intake, and exercise.    Patient Instructions     Please call to schedule a nurse only appointment to obtain your hepatitis B #3 vaccine.    Healthy Strategies  1. Eat at least 3 meals a day and never skip breakfast.  2. Eat more slowly.  3. Decrease portion size.  4. Provide structure by using meal replacement bars or shakes, and/or low calorie frozen meals.  5. For good nutrition incorporate fruit, vegetables, whole grains, lean protein, and low-fat dairy.  6. Remove trigger foods from yourenvironment to avoid impulse eating.  7. Increase physical activity: get a pedometer and aim for 10,000 steps a day or 30-35 minutes of activity 5 days per week.  8. Weigh yourself daily or at least weekly.  9. Keep a record of what you eat and your activity.  10. Establish a support system such as afriend, group or program.    11. Read Junior Montana's \"Eat to Live\". Remember it is important to have a minimum of 1200 calories a day, okay to use olive oil, 40 grams of fiber daily. No more than two servings (the size of your palm) of red meat a week.     Please consider the following general health recommendations:    Eat a quality diet (generally, low in simple sugars, starches, cholesterol and saturated fat.)    Please get 1200 mg of calcium in divided doses with 800 units vitamin D in your diet daily. Take supplements as needed to obtain full recommended amounts.     Stay physically active. Regular walking or other exercise is one of the best ways to " minimize pain of arthritis; maintain independence and mobility; maintain bone strength; maintain conditioning of your heart. Find something you enjoy and a friend to do it with you.    Maintain ideal weight. Your Body mass index is Body mass index is 48.55 kg/m .. Generally a BMI of 20-25 is considered ideal. Overweight is defined as 25-30, obese is 30-35 and markedly obese is greater than 35.    Apply sun block (SPF 25 or greater) on exposed skin anytime you are out in the sun to prevent skin cancer.     Wear a seatbelt whenever you are in a car.    Obtain a flu shot every fall.    You should have a tetanus booster at least once every 10 years.    Check blood sugar annually. Cholesterol annually unless you have had a normal level when last checked within 5 years.     I recommend that you have a general physical exam every year. You should have a pap test every 3 years between the ages of 21 and 30 and every 3-5 years between the ages of 30 and 65 depending on your test unless you have had previous abnormal pap smears, (in these cases the exams and PAP's should be done on a schedule as recommended by your primary care provider). If you have had hysterectomy in the past, your future Pap plan may be different.       Patient Education     Eating a Low-fiber Diet  What is fiber?  Fiber is the part of food that the body cannot digest. It helps form stools (bowel movements).   If you eat less fiber, you may:    Reduce belly pain, diarrhea (loose, watery stools) and other digestive problems    Have fewer and smaller stools    Decrease inflammation (pain, redness and swelling) in the GI (gastro-intestinal) tract    Promote healing in the GI tract.  For a list of foods allowed in a low-fiber diet, see the back of this page.  Why might I need a low-fiber diet?  You may need a low-fiber diet if you have:     Inflamed bowels    Crohn's disease    Diverticular disease    Ulcerative colitis    Radiation therapy to the belly  area    Chemotherapy    An upcoming colonoscopy    Surgery on your intestines or in the belly area.  Sample Menu  Breakfast:  1 scrambled egg  1 slice white toast with 1 teaspoon margarine    cup Cream of Wheat with sugar    cup milk     cup pulp-free orange juice  Snack:    cup canned fruit cocktail (in juice)  6 saltine crackers  Lunch:  Tuna sandwich on white bread   1 cup cream of chicken soup    cup canned peaches (in light syrup)  1 cup lemonade  Snack:    cup cottage cheese  1 medium apple, sliced and peeled  Dinner:  3 ounces well-cooked chicken breast  1 cup white rice    cup cooked canned carrots  1 white dinner roll with 1 teaspoon margarine  1 slice shelton food cake  1 cup herbal tea  Food group Allowed Avoid   Grains Foods that contain refined white flour   (1 gram fiber or less per serving), such as bread, pasta, muffins, cereals, crackers, etc.; white rice; Cream of Wheat; Cream of Rice Whole grains (whole wheat bread, oatmeal, barley, brown or wild rice); foods containing nuts, seeds or bran   Vegetables Canned or well-cooked vegetables; mashed potatoes; non-gas-forming vegetables; vegetables without skin, seeds or pulp; vegetable juice ( 1/2 cup per day or less) Raw vegetables; cooked greens or spinach;   gas-forming vegetables (broccoli, cauliflower, brussels sprouts)   Fruits Peeled fresh fruit (bananas, apples, melons, nectarines); canned fruit (in juice or light syrup); fruit juice without pulp Dried fruit; fruit with pulp (oranges, grapefruit, pineapple); unpeeled fruit; prune juice   Meats and   other proteins Tender, well-cooked or ground meats; fish; eggs; tofu; smooth nut butters (peanut, soy, almond, sunflower) Crunchy nut butters; tough meats; meats with gristle (maldonado, sausage); dried beans or peas (legumes)   Milk products Milk, soy milk, rice milk, almond milk, coconut milk; yogurt, soy yogurt; cottage cheese, mild cheese; ice cream, sherbet If you are lactose intolerant: avoid milk,  "dairy products and foods made with milk  Note: Some people become lactose intolerant after surgery. This may or may not improve over time.   Other Salad dressings; oil, butter, margarine; jelly, honey, syrup Any food containing nuts or seeds; coconut; marmalade; carbonated (\"fizzy\") drinks   For informational purposes only. Not to replace the advice of your health care provider.   Copyright   2007 [employer]. All rights reserved. [employer] 985931 - REV 09/15.  For informational purposes only. Not to replace the advice of your health care provider.  Copyright   2018 [employer]. All rights reserved.           Patient Education     Eating Heart-Healthy Foods  Eating has a big impact on your heart health. In fact, eating healthier can improve several of your heart risks at once. For instance, it helps you manage weight, cholesterol, and blood pressure. Here are ideas to help you make heart-healthy changes without giving up all the foods and flavors you love.   Getting started    Talk with your healthcare provider about eating plans, such as the DASH or Mediterranean diet. You may also be referred to a dietitian.    Change a few things at a time. Give yourself time to get used to a few eating changes before adding more.    Work to create a tasty, healthy eating plan that you can stick to for the rest of your life.    Goals for healthy eating  Below are some tips to improve your eating habits:     Limit saturated fats and trans fats. Saturated fats raise your levels of cholesterol, so keep these fats to a minimum. They are found in foods such as fatty meats, whole milk, cheese, and palm and coconut oils. Avoid trans fats because they lower good cholesterol as well as raise bad cholesterol. Trans fats are most often found in processed foods, such as pastries, cookies, pies, muffins, fried foods, stick margarines, and shortening.    Reduce how much sodium (salt) you have. Eating too much salt " may increase your blood pressure. Limit your sodium intake to 2,300 milligrams (mg) per day (the amount in 1 teaspoon of salt), or less if your healthcare provider recommends it. Dining out less often and eating fewer processed foods are two great ways to decrease the amount of salt you consume. At home, flavor your foods with other spices and herbs instead of salt.    Managing calories. A calorie is a unit of energy. Your body burns calories for fuel, but if you eat more calories than your body burns, the extras are stored as fat. Your healthcare provider can help you create a diet plan to manage your calories. This will likely include eating healthier foods and getting regular exercise. To help you track your progress, keep a diary to record what you eat and how often you exercise.  Choose the right foods  Aim to make these foods staples of your diet. If you have diabetes, you may have different recommendations than what is listed here:     Fruits and vegetables provide plenty of nutrients without a lot of calories. At meals, fill half your plate with these foods. Choose between fresh, frozen, canned, or dried without added sauces, salt, or sugars. Split the other half of your plate between whole grains and lean protein.    Whole grains are high in fiber and rich in vitamins and nutrients. Good choices include whole wheat bread, pasta, oats, and brown rice. Make at least half of your grains whole grains.    Lean proteins give you nutrition with less fat. Good choices include fish, skinless chicken and turkey, and beans. Draining the fat from cooked ground meat is another way to reduce the amount of fat you eat.    Low-fat and nonfat dairy provide nutrients without a lot of fat. Try low-fat or nonfat milk, cheese, or yogurt.    Healthy fats can be good for you in small amounts. These are unsaturated fats, such as olive oil, nuts, and fish. Try to have at least 2 servings per week of fatty fish, such as salmon,  sardines, mackerel, rainbow trout, and albacore tuna. These contain omega-3 fatty acids, which are good for your heart. Flaxseed and walnuts are other sources of heart-healthy fats.  More on heart-healthy eating  Read food labels  Healthy eating starts at the grocery store. Be sure to pay attention to food labels on packaged foods. Look for products that are high in fiber and protein, and low in saturated fat, added sugars, and sodium. Avoid products that contain trans fat. And pay close attention to serving size. For instance, if you plan to eat two servings, double all the numbers on the label.   Prepare food right  A key part of healthy cooking is cutting down on added fat, sugar and salt. Look on the internet for lower-fat, lower-sodium recipes without a lot of added sugars. Also try these tips:     Remove fat from meat and skin from poultry before cooking.    Skim fat from the surface of soups and sauces.    Broil, roast, boil, bake, steam, grill, or microwave food without added fats.    Choose ingredients that spice up your food without adding calories, fat, sugar, or sodium. Try these items: horseradish, hot sauce, lemon, mustard, nonfat salad dressings, and vinegar. Small amounts of olive oil-based vinaigrettes are OK, too. For salt-free herbs and spices, try basil, cilantro, cinnamon, cumin, paprika, pepper, and rosemary.  [employer] last reviewed this educational content on 7/1/2020 2000-2021 [employer]. All rights reserved. This information is not intended as a substitute for professional medical care. Always follow your healthcare professional's instructions.           Patient Education     Healthy Eating on the Go    Wherever your family goes, healthy eating can still be easy for you and fun for your kids. Pack sliced vegetables, fruits, and nonfat or low-fat dips in plastic bags or containers. Make fun and easy snacks like celery with peanut butter and raisins. Bring plenty of bottled  "water. Anywhere you go, you'll be ready when you and your child feel hungry.  At a fast-food restaurant  Eating healthy at fast-food restaurants means choosing the right foods.    Instead of fried foods, try grilled meats like chicken and fish. Look for baked potatoes topped with vegetables or salads. Order low-fat or nonfat milk instead of soda. Get fruit or yogurt instead of milkshakes or cookies.    If your child isn't ready for you to stop ordering French fries, get one serving for everyone to share. This gives everyone a taste without making French fries the center of the meal.    Lead by example. Make healthy choices for yourself. Kids watch how and what you eat. They'll be more likely to eat healthy foods that you eat, too.  At the store  Do you shop at corner markets or convenience stores? You can still find healthy foods for your family. Follow these tips:    Buy canned vegetables and fruits packed in water or fruit juice. Don't buy those packed in heavy syrup. If you have to buy fruit packed in syrup, rinse the fruit with water and throw away the syrup.    Choose whole-grain products such as brown rice, corn tortillas, and whole-wheat breads. Look for the words \"whole grain\" on the package, not just \"wheat.\"    Find sources of protein such as canned beans, tuna canned in water, eggs, low-fat or nonfat milk, and low-fat or nonfat cheese and yogurt.    Stay away from the snack foods! Don't be drawn in by all the chips, candy, soda, and sugar-filled cereals.  [employer] last reviewed this educational content on 6/1/2020 2000-2021 [employer]. All rights reserved. This information is not intended as a substitute for professional medical care. Always follow your healthcare professional's instructions.                Coco Galvan PA-C PA-C..................5/10/2021 9:43 AM       "

## 2021-05-12 ENCOUNTER — HOSPITAL ENCOUNTER (OUTPATIENT)
Dept: ULTRASOUND IMAGING | Facility: OTHER | Age: 31
Discharge: HOME | End: 2021-05-12
Attending: PHYSICIAN ASSISTANT | Admitting: PHYSICIAN ASSISTANT
Payer: COMMERCIAL

## 2021-05-12 DIAGNOSIS — R10.2 PELVIC CRAMPING: ICD-10-CM

## 2021-05-12 PROCEDURE — 76856 US EXAM PELVIC COMPLETE: CPT

## 2021-05-12 PROCEDURE — 76830 TRANSVAGINAL US NON-OB: CPT

## 2021-05-13 ENCOUNTER — TELEPHONE (OUTPATIENT)
Dept: FAMILY MEDICINE | Facility: OTHER | Age: 31
End: 2021-05-13

## 2021-05-13 DIAGNOSIS — R10.2 PELVIC CRAMPING: ICD-10-CM

## 2021-05-13 DIAGNOSIS — N91.2 AMENORRHEA: Primary | ICD-10-CM

## 2021-05-13 DIAGNOSIS — N83.201 RIGHT OVARIAN CYST: ICD-10-CM

## 2021-05-13 NOTE — TELEPHONE ENCOUNTER
Patient viewed on [employer].  Destiny Lockwood LPN ...... 5/13/2021 10:54 AM

## 2021-05-17 LAB
COPATH REPORT: NORMAL
PAP: NORMAL

## 2021-05-19 LAB
FINAL DIAGNOSIS: ABNORMAL
HPV HR 12 DNA CVX QL NAA+PROBE: POSITIVE
HPV16 DNA SPEC QL NAA+PROBE: NEGATIVE
HPV18 DNA SPEC QL NAA+PROBE: NEGATIVE
SPECIMEN DESCRIPTION: ABNORMAL
SPECIMEN SOURCE CVX/VAG CYTO: ABNORMAL

## 2021-10-09 ENCOUNTER — HEALTH MAINTENANCE LETTER (OUTPATIENT)
Age: 31
End: 2021-10-09

## 2022-07-16 ENCOUNTER — HEALTH MAINTENANCE LETTER (OUTPATIENT)
Age: 32
End: 2022-07-16

## 2022-09-17 ENCOUNTER — HEALTH MAINTENANCE LETTER (OUTPATIENT)
Age: 32
End: 2022-09-17

## 2023-07-30 ENCOUNTER — HEALTH MAINTENANCE LETTER (OUTPATIENT)
Age: 33
End: 2023-07-30

## 2024-10-03 ENCOUNTER — OFFICE VISIT (OUTPATIENT)
Dept: FAMILY MEDICINE | Facility: OTHER | Age: 34
End: 2024-10-03
Attending: NURSE PRACTITIONER
Payer: COMMERCIAL

## 2024-10-03 VITALS
RESPIRATION RATE: 18 BRPM | HEART RATE: 87 BPM | SYSTOLIC BLOOD PRESSURE: 124 MMHG | TEMPERATURE: 97.8 F | WEIGHT: 280 LBS | OXYGEN SATURATION: 98 % | DIASTOLIC BLOOD PRESSURE: 82 MMHG | BODY MASS INDEX: 43.95 KG/M2 | HEIGHT: 67 IN

## 2024-10-03 DIAGNOSIS — E66.01 MORBID OBESITY (H): ICD-10-CM

## 2024-10-03 DIAGNOSIS — F41.9 ANXIETY: Primary | ICD-10-CM

## 2024-10-03 DIAGNOSIS — E61.1 IRON DEFICIENCY: ICD-10-CM

## 2024-10-03 PROCEDURE — 99203 OFFICE O/P NEW LOW 30 MIN: CPT | Performed by: NURSE PRACTITIONER

## 2024-10-03 PROCEDURE — G0463 HOSPITAL OUTPT CLINIC VISIT: HCPCS

## 2024-10-03 RX ORDER — CALCIUM CARBONATE/VITAMIN D3 600 MG-10
1 TABLET ORAL
COMMUNITY

## 2024-10-03 RX ORDER — HYDROXYZINE HYDROCHLORIDE 25 MG/1
25 TABLET, FILM COATED ORAL 3 TIMES DAILY PRN
Qty: 15 TABLET | Refills: 1 | Status: SHIPPED | OUTPATIENT
Start: 2024-10-03

## 2024-10-03 RX ORDER — SERTRALINE HYDROCHLORIDE 100 MG/1
100 TABLET, FILM COATED ORAL DAILY
Qty: 90 TABLET | Refills: 4 | Status: SHIPPED | OUTPATIENT
Start: 2024-10-03

## 2024-10-03 ASSESSMENT — ANXIETY QUESTIONNAIRES
2. NOT BEING ABLE TO STOP OR CONTROL WORRYING: SEVERAL DAYS
8. IF YOU CHECKED OFF ANY PROBLEMS, HOW DIFFICULT HAVE THESE MADE IT FOR YOU TO DO YOUR WORK, TAKE CARE OF THINGS AT HOME, OR GET ALONG WITH OTHER PEOPLE?: SOMEWHAT DIFFICULT
7. FEELING AFRAID AS IF SOMETHING AWFUL MIGHT HAPPEN: SEVERAL DAYS
4. TROUBLE RELAXING: MORE THAN HALF THE DAYS
5. BEING SO RESTLESS THAT IT IS HARD TO SIT STILL: NEARLY EVERY DAY
IF YOU CHECKED OFF ANY PROBLEMS ON THIS QUESTIONNAIRE, HOW DIFFICULT HAVE THESE PROBLEMS MADE IT FOR YOU TO DO YOUR WORK, TAKE CARE OF THINGS AT HOME, OR GET ALONG WITH OTHER PEOPLE: SOMEWHAT DIFFICULT
GAD7 TOTAL SCORE: 11
GAD7 TOTAL SCORE: 11
6. BECOMING EASILY ANNOYED OR IRRITABLE: MORE THAN HALF THE DAYS
3. WORRYING TOO MUCH ABOUT DIFFERENT THINGS: SEVERAL DAYS
1. FEELING NERVOUS, ANXIOUS, OR ON EDGE: SEVERAL DAYS

## 2024-10-03 ASSESSMENT — PAIN SCALES - GENERAL: PAINLEVEL: NO PAIN (0)

## 2024-10-03 NOTE — PROGRESS NOTES
"  Assessment & Plan   Problem List Items Addressed This Visit          Digestive    Morbid obesity (H)    Iron deficiency    Relevant Orders    CBC and Differential    Ferritin    Iron & Iron Binding Capacity     Other Visit Diagnoses       Anxiety    -  Primary    Relevant Medications    sertraline (ZOLOFT) 100 MG tablet    hydrOXYzine HCl (ATARAX) 25 MG tablet           Morbid obesity, discussed continued dietary modifications, exercise.  She is not interested in medications at this time  Iron deficiency, iron infusions ordered, plan to repeat labs after infusions completed.  Sertraline increased due to anxiety, hydroxyzine ordered        BMI  Estimated body mass index is 43.85 kg/m  as calculated from the following:    Height as of this encounter: 1.702 m (5' 7\").    Weight as of this encounter: 127 kg (280 lb).   Weight management plan: Discussed healthy diet and exercise guidelines        No follow-ups on file.      Luzma Cartwright is a 34 year old, presenting for the following health issues:  Establish Care and Refill Request      10/3/2024     8:17 AM   Additional Questions   Roomed by Sandi Amaya LPN         10/3/2024     8:17 AM   Patient Reported Additional Medications   Patient reports taking the following new medications N/A     History of Present Illness       Mental Health Follow-up:  Patient presents to follow-up on Anxiety.    Patient's anxiety since last visit has been:  Medium  The patient is having other symptoms associated with anxiety.  Any significant life events: No  Patient is feeling anxious or having panic attacks.  Patient has no concerns about alcohol or drug use.    She eats 2-3 servings of fruits and vegetables daily.She consumes 0 sweetened beverage(s) daily.She exercises with enough effort to increase her heart rate 10 to 19 minutes per day.  She exercises with enough effort to increase her heart rate 3 or less days per week.   She is taking medications regularly.     The " "clinic today to establish care.  She is on sertraline, need to have refills of medication.  Feels sertraline is not working as well as it used to, would like to have an increase in her dosing.    Reports chronic anemia, has had infusions in the past.  Most recent lab work showed significant reduction in hemoglobin.  Looking to have infusions ordered again.    She does have morbid obesity, has been working on weight loss through dietary changes and exercise.  Has lost about 36 pounds in the past year.  Follows approximately 1750 rina/day.  Was doing more exercises but has chronic pain in her right shoulder which has limited her physical activity.            Objective    /82 (BP Location: Right arm, Patient Position: Sitting)   Pulse 87   Temp 97.8  F (36.6  C) (Tympanic)   Resp 18   Ht 1.702 m (5' 7\")   Wt 127 kg (280 lb)   LMP 09/29/2024 (Exact Date)   SpO2 98%   BMI 43.85 kg/m    Body mass index is 43.85 kg/m .  Physical Exam   GENERAL: alert and no distress  EYES: Eyes grossly normal to inspection,  RESP: lungs clear to auscultation - no rales, rhonchi or wheezes  CV: regular rate and rhythm, normal S1 S2  NEURO: Normal strength and tone, mentation intact and speech normal  PSYCH: mentation appears normal, affect normal/bright            Signed Electronically by: SINCERE Nina CNP    "

## 2024-10-03 NOTE — NURSING NOTE
"Chief Complaint   Patient presents with    Establish Care    Refill Request       Initial /82 (BP Location: Right arm, Patient Position: Sitting)   Pulse 87   Temp 97.8  F (36.6  C) (Tympanic)   Resp 18   Ht 1.702 m (5' 7\")   Wt 127 kg (280 lb)   LMP 09/29/2024 (Exact Date)   SpO2 98%   BMI 43.85 kg/m   Estimated body mass index is 43.85 kg/m  as calculated from the following:    Height as of this encounter: 1.702 m (5' 7\").    Weight as of this encounter: 127 kg (280 lb).  Medication Review: complete    The next two questions are to help us understand your food security.  If you are feeling you need any assistance in this area, we have resources available to support you today.           No data to display                  Health Care Directive:  Patient does not have a Health Care Directive or Living Will: Discussed advance care planning with patient; information given to patient to review.    Sandi Amaya LPN      "

## 2024-10-04 PROBLEM — E61.1 IRON DEFICIENCY: Status: ACTIVE | Noted: 2024-10-04

## 2024-10-04 RX ORDER — HEPARIN SODIUM (PORCINE) LOCK FLUSH IV SOLN 100 UNIT/ML 100 UNIT/ML
5 SOLUTION INTRAVENOUS
OUTPATIENT
Start: 2024-10-11

## 2024-10-04 RX ORDER — EPINEPHRINE 1 MG/ML
0.3 INJECTION, SOLUTION, CONCENTRATE INTRAVENOUS EVERY 5 MIN PRN
OUTPATIENT
Start: 2024-10-11

## 2024-10-04 RX ORDER — HEPARIN SODIUM,PORCINE 10 UNIT/ML
5-20 VIAL (ML) INTRAVENOUS DAILY PRN
OUTPATIENT
Start: 2024-10-11

## 2024-10-04 RX ORDER — ALBUTEROL SULFATE 90 UG/1
1-2 INHALANT RESPIRATORY (INHALATION)
Start: 2024-10-11

## 2024-10-04 RX ORDER — ALBUTEROL SULFATE 0.83 MG/ML
2.5 SOLUTION RESPIRATORY (INHALATION)
OUTPATIENT
Start: 2024-10-11

## 2024-10-04 RX ORDER — DIPHENHYDRAMINE HYDROCHLORIDE 50 MG/ML
50 INJECTION INTRAMUSCULAR; INTRAVENOUS
Start: 2024-10-11

## 2024-10-04 RX ORDER — METHYLPREDNISOLONE SODIUM SUCCINATE 125 MG/2ML
125 INJECTION INTRAMUSCULAR; INTRAVENOUS
Start: 2024-10-11

## 2024-10-04 RX ORDER — MEPERIDINE HYDROCHLORIDE 50 MG/ML
25 INJECTION INTRAMUSCULAR; INTRAVENOUS; SUBCUTANEOUS EVERY 30 MIN PRN
OUTPATIENT
Start: 2024-10-11

## 2024-11-11 ENCOUNTER — MYC MEDICAL ADVICE (OUTPATIENT)
Dept: FAMILY MEDICINE | Facility: OTHER | Age: 34
End: 2024-11-11
Payer: COMMERCIAL

## 2024-11-11 ENCOUNTER — HOSPITAL ENCOUNTER (OUTPATIENT)
Dept: INFUSION THERAPY | Facility: OTHER | Age: 34
Discharge: HOME | End: 2024-11-11
Admitting: FAMILY MEDICINE
Payer: COMMERCIAL

## 2024-11-11 VITALS
HEART RATE: 71 BPM | SYSTOLIC BLOOD PRESSURE: 130 MMHG | TEMPERATURE: 97 F | RESPIRATION RATE: 16 BRPM | DIASTOLIC BLOOD PRESSURE: 51 MMHG

## 2024-11-11 DIAGNOSIS — D56.0 ALPHA-THALASSEMIA (H): ICD-10-CM

## 2024-11-11 DIAGNOSIS — E61.1 IRON DEFICIENCY: Primary | ICD-10-CM

## 2024-11-11 PROCEDURE — 96366 THER/PROPH/DIAG IV INF ADDON: CPT

## 2024-11-11 PROCEDURE — 96365 THER/PROPH/DIAG IV INF INIT: CPT

## 2024-11-11 PROCEDURE — 250N000011 HC RX IP 250 OP 636: Performed by: NURSE PRACTITIONER

## 2024-11-11 PROCEDURE — 258N000003 HC RX IP 258 OP 636: Performed by: NURSE PRACTITIONER

## 2024-11-11 RX ORDER — DIPHENHYDRAMINE HYDROCHLORIDE 50 MG/ML
50 INJECTION INTRAMUSCULAR; INTRAVENOUS
Start: 2024-11-13

## 2024-11-11 RX ORDER — ALBUTEROL SULFATE 0.83 MG/ML
2.5 SOLUTION RESPIRATORY (INHALATION)
OUTPATIENT
Start: 2024-11-13

## 2024-11-11 RX ORDER — EPINEPHRINE 1 MG/ML
0.3 INJECTION, SOLUTION, CONCENTRATE INTRAVENOUS EVERY 5 MIN PRN
OUTPATIENT
Start: 2024-11-13

## 2024-11-11 RX ORDER — HEPARIN SODIUM,PORCINE 10 UNIT/ML
5-20 VIAL (ML) INTRAVENOUS DAILY PRN
OUTPATIENT
Start: 2024-11-13

## 2024-11-11 RX ORDER — ALBUTEROL SULFATE 90 UG/1
1-2 INHALANT RESPIRATORY (INHALATION)
Start: 2024-11-13

## 2024-11-11 RX ORDER — HEPARIN SODIUM (PORCINE) LOCK FLUSH IV SOLN 100 UNIT/ML 100 UNIT/ML
5 SOLUTION INTRAVENOUS
OUTPATIENT
Start: 2024-11-13

## 2024-11-11 RX ORDER — METHYLPREDNISOLONE SODIUM SUCCINATE 125 MG/2ML
125 INJECTION INTRAMUSCULAR; INTRAVENOUS
Start: 2024-11-13

## 2024-11-11 RX ORDER — MEPERIDINE HYDROCHLORIDE 50 MG/ML
25 INJECTION INTRAMUSCULAR; INTRAVENOUS; SUBCUTANEOUS EVERY 30 MIN PRN
OUTPATIENT
Start: 2024-11-13

## 2024-11-11 RX ADMIN — SODIUM CHLORIDE 300 MG: 900 INJECTION, SOLUTION INTRAVENOUS at 08:36

## 2024-11-11 NOTE — NURSING NOTE
Infusion Nursing Note:  Gabbie Tavares presents today for Venofer 1/3.    Patient seen by provider today: No   present during visit today: Not Applicable.    Note: N/A.      Intravenous Access:  Peripheral IV placed.    Treatment Conditions:  Not Applicable.      Post Infusion Assessment:  Patient tolerated infusion without incident.  Blood return noted pre and post infusion.  Site patent and intact, free from redness, edema or discomfort.  No evidence of extravasations.  Access discontinued per protocol.       Discharge Plan:   Discharge instructions reviewed with: Patient.  Patient and/or family verbalized understanding of discharge instructions and all questions answered.  Copy of AVS declined. Patient will return 11-18-24 for next appointment.  AVS to patient via [employer].   Patient discharged in stable condition accompanied by: self.  Departure Mode: Ambulatory.      Jennifer Hansen RN

## 2024-11-11 NOTE — TELEPHONE ENCOUNTER
Pt is wanting referral to dietitian/nutritionist for food education r/t thalassemia.     Hussein'd up referral.     Routing to provider to review and respond.  Juan A Khoury RN on 11/11/2024 at 3:32 PM

## 2024-11-13 ENCOUNTER — APPOINTMENT (OUTPATIENT)
Dept: CT IMAGING | Facility: OTHER | Age: 34
End: 2024-11-13
Attending: STUDENT IN AN ORGANIZED HEALTH CARE EDUCATION/TRAINING PROGRAM
Payer: COMMERCIAL

## 2024-11-13 ENCOUNTER — HOSPITAL ENCOUNTER (EMERGENCY)
Facility: OTHER | Age: 34
Discharge: HOME | End: 2024-11-14
Attending: STUDENT IN AN ORGANIZED HEALTH CARE EDUCATION/TRAINING PROGRAM
Payer: COMMERCIAL

## 2024-11-13 DIAGNOSIS — J18.9 PNEUMONIA OF BOTH LUNGS DUE TO INFECTIOUS ORGANISM, UNSPECIFIED PART OF LUNG: ICD-10-CM

## 2024-11-13 LAB
ALBUMIN SERPL BCG-MCNC: 4 G/DL (ref 3.5–5.2)
ALP SERPL-CCNC: 87 U/L (ref 40–150)
ALT SERPL W P-5'-P-CCNC: 18 U/L (ref 0–50)
ANION GAP SERPL CALCULATED.3IONS-SCNC: 11 MMOL/L (ref 7–15)
AST SERPL W P-5'-P-CCNC: 31 U/L (ref 0–45)
BASOPHILS # BLD AUTO: 0 10E3/UL (ref 0–0.2)
BASOPHILS NFR BLD AUTO: 0 %
BILIRUB SERPL-MCNC: 0.4 MG/DL
BUN SERPL-MCNC: 6.2 MG/DL (ref 6–20)
CALCIUM SERPL-MCNC: 8.7 MG/DL (ref 8.8–10.4)
CHLORIDE SERPL-SCNC: 104 MMOL/L (ref 98–107)
CK SERPL-CCNC: 44 U/L (ref 26–192)
CREAT SERPL-MCNC: 0.86 MG/DL (ref 0.51–0.95)
CRP SERPL-MCNC: 66.58 MG/L
D DIMER PPP FEU-MCNC: 1.02 UG/ML FEU (ref 0–0.5)
EGFRCR SERPLBLD CKD-EPI 2021: 90 ML/MIN/1.73M2
EOSINOPHIL # BLD AUTO: 0.2 10E3/UL (ref 0–0.7)
EOSINOPHIL NFR BLD AUTO: 2 %
ERYTHROCYTE [DISTWIDTH] IN BLOOD BY AUTOMATED COUNT: 18.3 % (ref 10–15)
GLUCOSE SERPL-MCNC: 125 MG/DL (ref 70–99)
HCG SERPL QL: NEGATIVE
HCO3 SERPL-SCNC: 19 MMOL/L (ref 22–29)
HCT VFR BLD AUTO: 34.5 % (ref 35–47)
HGB BLD-MCNC: 10 G/DL (ref 11.7–15.7)
IMM GRANULOCYTES # BLD: 0.2 10E3/UL
IMM GRANULOCYTES NFR BLD: 2 %
LIPASE SERPL-CCNC: 19 U/L (ref 13–60)
LYMPHOCYTES # BLD AUTO: 1 10E3/UL (ref 0.8–5.3)
LYMPHOCYTES NFR BLD AUTO: 12 %
MCH RBC QN AUTO: 19.3 PG (ref 26.5–33)
MCHC RBC AUTO-ENTMCNC: 29 G/DL (ref 31.5–36.5)
MCV RBC AUTO: 67 FL (ref 78–100)
MONOCYTES # BLD AUTO: 0.5 10E3/UL (ref 0–1.3)
MONOCYTES NFR BLD AUTO: 6 %
NEUTROPHILS # BLD AUTO: 6.7 10E3/UL (ref 1.6–8.3)
NEUTROPHILS NFR BLD AUTO: 79 %
NRBC # BLD AUTO: 0 10E3/UL
NRBC BLD AUTO-RTO: 0 /100
NT-PROBNP SERPL-MCNC: 43 PG/ML (ref 0–450)
PLATELET # BLD AUTO: 284 10E3/UL (ref 150–450)
POTASSIUM SERPL-SCNC: 4.4 MMOL/L (ref 3.4–5.3)
PROCALCITONIN SERPL IA-MCNC: 0.13 NG/ML
PROT SERPL-MCNC: 7.5 G/DL (ref 6.4–8.3)
RBC # BLD AUTO: 5.19 10E6/UL (ref 3.8–5.2)
SODIUM SERPL-SCNC: 134 MMOL/L (ref 135–145)
TROPONIN T SERPL HS-MCNC: <6 NG/L
WBC # BLD AUTO: 8.5 10E3/UL (ref 4–11)

## 2024-11-13 PROCEDURE — 93005 ELECTROCARDIOGRAM TRACING: CPT | Performed by: STUDENT IN AN ORGANIZED HEALTH CARE EDUCATION/TRAINING PROGRAM

## 2024-11-13 PROCEDURE — 84145 PROCALCITONIN (PCT): CPT | Performed by: STUDENT IN AN ORGANIZED HEALTH CARE EDUCATION/TRAINING PROGRAM

## 2024-11-13 PROCEDURE — 99284 EMERGENCY DEPT VISIT MOD MDM: CPT | Performed by: STUDENT IN AN ORGANIZED HEALTH CARE EDUCATION/TRAINING PROGRAM

## 2024-11-13 PROCEDURE — 99285 EMERGENCY DEPT VISIT HI MDM: CPT | Mod: 25 | Performed by: STUDENT IN AN ORGANIZED HEALTH CARE EDUCATION/TRAINING PROGRAM

## 2024-11-13 PROCEDURE — 93010 ELECTROCARDIOGRAM REPORT: CPT | Performed by: INTERNAL MEDICINE

## 2024-11-13 PROCEDURE — 84484 ASSAY OF TROPONIN QUANT: CPT | Performed by: STUDENT IN AN ORGANIZED HEALTH CARE EDUCATION/TRAINING PROGRAM

## 2024-11-13 PROCEDURE — 85041 AUTOMATED RBC COUNT: CPT | Performed by: STUDENT IN AN ORGANIZED HEALTH CARE EDUCATION/TRAINING PROGRAM

## 2024-11-13 PROCEDURE — 250N000013 HC RX MED GY IP 250 OP 250 PS 637: Performed by: STUDENT IN AN ORGANIZED HEALTH CARE EDUCATION/TRAINING PROGRAM

## 2024-11-13 PROCEDURE — 84703 CHORIONIC GONADOTROPIN ASSAY: CPT | Performed by: STUDENT IN AN ORGANIZED HEALTH CARE EDUCATION/TRAINING PROGRAM

## 2024-11-13 PROCEDURE — 36415 COLL VENOUS BLD VENIPUNCTURE: CPT | Performed by: STUDENT IN AN ORGANIZED HEALTH CARE EDUCATION/TRAINING PROGRAM

## 2024-11-13 PROCEDURE — 87637 SARSCOV2&INF A&B&RSV AMP PRB: CPT | Performed by: STUDENT IN AN ORGANIZED HEALTH CARE EDUCATION/TRAINING PROGRAM

## 2024-11-13 PROCEDURE — 86140 C-REACTIVE PROTEIN: CPT | Performed by: STUDENT IN AN ORGANIZED HEALTH CARE EDUCATION/TRAINING PROGRAM

## 2024-11-13 PROCEDURE — 250N000011 HC RX IP 250 OP 636: Performed by: STUDENT IN AN ORGANIZED HEALTH CARE EDUCATION/TRAINING PROGRAM

## 2024-11-13 PROCEDURE — 85004 AUTOMATED DIFF WBC COUNT: CPT | Performed by: STUDENT IN AN ORGANIZED HEALTH CARE EDUCATION/TRAINING PROGRAM

## 2024-11-13 PROCEDURE — 96375 TX/PRO/DX INJ NEW DRUG ADDON: CPT | Performed by: STUDENT IN AN ORGANIZED HEALTH CARE EDUCATION/TRAINING PROGRAM

## 2024-11-13 PROCEDURE — 83880 ASSAY OF NATRIURETIC PEPTIDE: CPT | Performed by: STUDENT IN AN ORGANIZED HEALTH CARE EDUCATION/TRAINING PROGRAM

## 2024-11-13 PROCEDURE — 83690 ASSAY OF LIPASE: CPT | Performed by: STUDENT IN AN ORGANIZED HEALTH CARE EDUCATION/TRAINING PROGRAM

## 2024-11-13 PROCEDURE — 85379 FIBRIN DEGRADATION QUANT: CPT | Performed by: STUDENT IN AN ORGANIZED HEALTH CARE EDUCATION/TRAINING PROGRAM

## 2024-11-13 PROCEDURE — 80053 COMPREHEN METABOLIC PANEL: CPT | Performed by: STUDENT IN AN ORGANIZED HEALTH CARE EDUCATION/TRAINING PROGRAM

## 2024-11-13 PROCEDURE — 82550 ASSAY OF CK (CPK): CPT | Performed by: STUDENT IN AN ORGANIZED HEALTH CARE EDUCATION/TRAINING PROGRAM

## 2024-11-13 PROCEDURE — 71275 CT ANGIOGRAPHY CHEST: CPT | Mod: TC

## 2024-11-13 RX ORDER — ACETAMINOPHEN 500 MG
500 TABLET ORAL ONCE
Status: COMPLETED | OUTPATIENT
Start: 2024-11-13 | End: 2024-11-13

## 2024-11-13 RX ORDER — IBUPROFEN 200 MG
200 TABLET ORAL ONCE
Status: COMPLETED | OUTPATIENT
Start: 2024-11-13 | End: 2024-11-13

## 2024-11-13 RX ORDER — ACETAMINOPHEN 325 MG/1
650 TABLET ORAL ONCE
Status: DISCONTINUED | OUTPATIENT
Start: 2024-11-13 | End: 2024-11-13

## 2024-11-13 RX ORDER — LORAZEPAM 2 MG/ML
1 INJECTION INTRAMUSCULAR ONCE
Status: COMPLETED | OUTPATIENT
Start: 2024-11-13 | End: 2024-11-13

## 2024-11-13 RX ORDER — IOPAMIDOL 755 MG/ML
100 INJECTION, SOLUTION INTRAVASCULAR ONCE
Status: COMPLETED | OUTPATIENT
Start: 2024-11-13 | End: 2024-11-14

## 2024-11-13 RX ADMIN — LORAZEPAM 1 MG: 2 INJECTION INTRAMUSCULAR; INTRAVENOUS at 23:10

## 2024-11-13 RX ADMIN — IBUPROFEN 200 MG: 200 TABLET, FILM COATED ORAL at 23:19

## 2024-11-13 RX ADMIN — ACETAMINOPHEN 500 MG: 500 TABLET, FILM COATED ORAL at 23:19

## 2024-11-13 ASSESSMENT — COLUMBIA-SUICIDE SEVERITY RATING SCALE - C-SSRS
1. IN THE PAST MONTH, HAVE YOU WISHED YOU WERE DEAD OR WISHED YOU COULD GO TO SLEEP AND NOT WAKE UP?: NO
6. HAVE YOU EVER DONE ANYTHING, STARTED TO DO ANYTHING, OR PREPARED TO DO ANYTHING TO END YOUR LIFE?: NO
2. HAVE YOU ACTUALLY HAD ANY THOUGHTS OF KILLING YOURSELF IN THE PAST MONTH?: NO

## 2024-11-13 ASSESSMENT — ACTIVITIES OF DAILY LIVING (ADL): ADLS_ACUITY_SCORE: 0

## 2024-11-14 ENCOUNTER — MYC MEDICAL ADVICE (OUTPATIENT)
Dept: FAMILY MEDICINE | Facility: OTHER | Age: 34
End: 2024-11-14
Payer: COMMERCIAL

## 2024-11-14 VITALS
WEIGHT: 276 LBS | BODY MASS INDEX: 45.98 KG/M2 | TEMPERATURE: 101.7 F | SYSTOLIC BLOOD PRESSURE: 102 MMHG | DIASTOLIC BLOOD PRESSURE: 59 MMHG | RESPIRATION RATE: 22 BRPM | HEIGHT: 65 IN | HEART RATE: 89 BPM | OXYGEN SATURATION: 96 %

## 2024-11-14 LAB
ATRIAL RATE - MUSE: 118 BPM
BASE EXCESS BLDV CALC-SCNC: -3.3 MMOL/L (ref -3–3)
DIASTOLIC BLOOD PRESSURE - MUSE: NORMAL MMHG
FLUAV RNA SPEC QL NAA+PROBE: NEGATIVE
FLUBV RNA RESP QL NAA+PROBE: NEGATIVE
HCO3 BLDV-SCNC: 21 MMOL/L (ref 21–28)
INTERPRETATION ECG - MUSE: NORMAL
O2/TOTAL GAS SETTING VFR VENT: 21 %
OXYHGB MFR BLDV: 85 % (ref 70–75)
P AXIS - MUSE: 8 DEGREES
PCO2 BLDV: 35 MM HG (ref 40–50)
PH BLDV: 7.39 [PH] (ref 7.32–7.43)
PO2 BLDV: 49 MM HG (ref 25–47)
PR INTERVAL - MUSE: 140 MS
QRS DURATION - MUSE: 76 MS
QT - MUSE: 324 MS
QTC - MUSE: 454 MS
R AXIS - MUSE: 60 DEGREES
RSV RNA SPEC NAA+PROBE: NEGATIVE
SAO2 % BLDV: 85.8 % (ref 70–75)
SARS-COV-2 RNA RESP QL NAA+PROBE: NEGATIVE
SYSTOLIC BLOOD PRESSURE - MUSE: NORMAL MMHG
T AXIS - MUSE: 37 DEGREES
VENTRICULAR RATE- MUSE: 118 BPM

## 2024-11-14 PROCEDURE — 250N000011 HC RX IP 250 OP 636: Performed by: STUDENT IN AN ORGANIZED HEALTH CARE EDUCATION/TRAINING PROGRAM

## 2024-11-14 PROCEDURE — 250N000009 HC RX 250: Performed by: STUDENT IN AN ORGANIZED HEALTH CARE EDUCATION/TRAINING PROGRAM

## 2024-11-14 PROCEDURE — 82805 BLOOD GASES W/O2 SATURATION: CPT | Performed by: STUDENT IN AN ORGANIZED HEALTH CARE EDUCATION/TRAINING PROGRAM

## 2024-11-14 PROCEDURE — 36415 COLL VENOUS BLD VENIPUNCTURE: CPT | Performed by: STUDENT IN AN ORGANIZED HEALTH CARE EDUCATION/TRAINING PROGRAM

## 2024-11-14 PROCEDURE — 250N000013 HC RX MED GY IP 250 OP 250 PS 637: Performed by: STUDENT IN AN ORGANIZED HEALTH CARE EDUCATION/TRAINING PROGRAM

## 2024-11-14 PROCEDURE — 96365 THER/PROPH/DIAG IV INF INIT: CPT | Performed by: STUDENT IN AN ORGANIZED HEALTH CARE EDUCATION/TRAINING PROGRAM

## 2024-11-14 RX ORDER — CEFDINIR 300 MG/1
300 CAPSULE ORAL 2 TIMES DAILY
Qty: 20 CAPSULE | Refills: 0 | Status: SHIPPED | OUTPATIENT
Start: 2024-11-14 | End: 2024-11-24

## 2024-11-14 RX ORDER — DOXYCYCLINE 100 MG/1
100 CAPSULE ORAL 2 TIMES DAILY
Qty: 14 CAPSULE | Refills: 0 | Status: SHIPPED | OUTPATIENT
Start: 2024-11-14 | End: 2024-11-21

## 2024-11-14 RX ORDER — CEFTRIAXONE 2 G/1
2 INJECTION, POWDER, FOR SOLUTION INTRAMUSCULAR; INTRAVENOUS ONCE
Status: COMPLETED | OUTPATIENT
Start: 2024-11-14 | End: 2024-11-14

## 2024-11-14 RX ORDER — DOXYCYCLINE 100 MG/1
100 CAPSULE ORAL ONCE
Status: COMPLETED | OUTPATIENT
Start: 2024-11-14 | End: 2024-11-14

## 2024-11-14 RX ADMIN — CEFTRIAXONE 2 G: 2 INJECTION, POWDER, FOR SOLUTION INTRAMUSCULAR; INTRAVENOUS at 01:21

## 2024-11-14 RX ADMIN — IOPAMIDOL 100 ML: 755 INJECTION, SOLUTION INTRAVENOUS at 00:26

## 2024-11-14 RX ADMIN — DOXYCYCLINE HYCLATE 100 MG: 100 CAPSULE ORAL at 01:22

## 2024-11-14 RX ADMIN — SODIUM CHLORIDE 90 ML: 9 INJECTION, SOLUTION INTRAVENOUS at 00:26

## 2024-11-14 ASSESSMENT — ACTIVITIES OF DAILY LIVING (ADL): ADLS_ACUITY_SCORE: 0

## 2024-11-14 NOTE — ED PROVIDER NOTES
"ED PROVIDER NOTE  Patient Name: Gabbie Tavares  MRN: [id number]    CC:    Chief Complaint   Patient presents with     Chest Pain     Shortness of Breath         MDM  34 year old female presenting with chest pain and shortness of breath.      In the ED, /60   Pulse 120   Temp (!) 100.7  F (38.2  C)   Resp 26   Ht 1.651 m (5' 5\")   Wt 125.2 kg (276 lb)   LMP 09/29/2024 (Exact Date)   SpO2 99%   BMI 45.93 kg/m      Physical exam revealed clear auscultation bilaterally.  Benign abdominal exam.  Grossly neurologically intact.  In no acute distress, no accessory muscle use.  Overall does not look critically ill or toxic .      I have independently reviewed and interpreted the patients test results which I have ordered this visit which are the following:      ED Course as of 11/14/24 0124   Wed Nov 13, 2024   2304 EKG interpreted by me reveals sinus rhythm, some artifact, ultimately no appreciable acute ischemia, ventricular to 118   2315 WBC: 8.5   2315 Hemoglobin(!): 10.0   2327 HCG Qualitative Serum: Negative   2337 Troponin T, High Sensitivity: <6   2357 N-Terminal Pro BNP Inpatient: 43   2357 CK Total: 44   2357 Procalcitonin: 0.13   2357 CRP Inflammation(!): 66.58   2357 Lipase: 19   2357 Sodium(!): 134   2357 Potassium: 4.4   2357 Creatinine: 0.86   Thu Nov 14, 2024   0026 Ph Venous: 7.39   0026 PCO2 Venous(!): 35   0026 Bicarbonate Venous: 21   0038 Influenza A: Negative   0038 Influenza B: Negative   0038 Resp Syncytial Virus: Negative   0038 SARS CoV2 PCR: Negative   0122 CT PE  1.   Given mild motion, no obvious pulmonary emboli.   2.   No aortic dissection.   3.   Right upper lobe and left lower lobe infiltrates concerning for multifocal   pneumonia.   4.   Bilateral hilar adenopathy. This is likely reactive given lung   infiltrates.               Multifocal pneumonia  Was seen for viral syndrome approximately 11/4. signs for worsening symptoms.  She has cough, chest pain shortness of breath " and pleuritic pain.  White blood cell count was reassuring as well as procalcitonin however CT angiogram revealed no signs of PE but multifocal pneumonia.  She was given ceftriaxone and doxycycline.  She had improvement of her tachycardia to 93  -Ceftriaxone 2 g IV and doxycycline 100 mg p.o.  -Tylenol 500 mg and ibuprofen Kermit, she had taken approximate 250 mg ibuprofen and 500 mg of Tylenol just prior to arrival.  -Lorazepam 1 mg IV for anxiety  -Cefdinir and doxycycline  - The patient was advised to follow up with PCP in 2-3 days and to return to the ED if symptoms worsened, or if there were any other urgent or life-threatening concerns.  - Following counseling on the work-up, results, diagnosis, and treatment plan, the patient was amenable to discharge after all questions were answered. The patient expressed agreement and understanding to the plan.      Clinical impression  Multifocal pneumonia    Disposition  Home    HPI    Gabbie Tavares is a 34 year old female with PMH of elevated BMI who presents with chest pain and shortness of breath.     History of obtained by: patient and family member    Patient presents for chest pain anterior radiating to the back associate shortness of breath and pleuritic anterior chest pain, upper and lower extremity pain.  She appears diaphoretic, states that she continues to have chills and sweats.  She has had no recent prolonged travel.  Denies any extremity swelling or unilateral extremity pain she has had no history of DVT or PE.  She states she has history of thalassemia..     PMH and SH reviewed.    10 point ROS reviewed and negative except as stated in HPI.    Past medical history:  Past Medical History:   Diagnosis Date     No pertinent past medical history        Social history:  Social Connections: Moderately Integrated (2/27/2024)    Received from [facility]    Social Connection and Isolation Panel [NHANES]      Frequency of Communication with  Friends and Family: More than three times a week      Frequency of Social Gatherings with Friends and Family: Twice a week      Attends Sikhism Services: More than 4 times per year      Active Member of Clubs or Organizations: Yes      Attends Club or Organization Meetings: More than 4 times per year      Marital Status: Never      Social History     Socioeconomic History     Marital status: Single     Spouse name: None     Number of children: None     Years of education: None     Highest education level: None   Tobacco Use     Smoking status: Former     Current packs/day: 0.50     Types: Cigarettes     Smokeless tobacco: Never   Substance and Sexual Activity     Alcohol use: Not Currently     Drug use: No     Sexual activity: Yes     Partners: Male     Birth control/protection: Pill     Social Drivers of Health     Financial Resource Strain: Low Risk  (2/27/2024)    Received from [facility]    Overall Financial Resource Strain (CARDIA)      Difficulty of Paying Living Expenses: Not hard at all   Food Insecurity: No Food Insecurity (2/27/2024)    Received from [facility]    Hunger Vital Sign      Worried About Running Out of Food in the Last Year: Never true      Ran Out of Food in the Last Year: Never true   Transportation Needs: No Transportation Needs (2/27/2024)    Received from [facility]    PRAPARE - Transportation      Lack of Transportation (Medical): No      Lack of Transportation (Non-Medical): No   Physical Activity: Insufficiently Active (2/27/2024)    Received from [facility]    Exercise Vital Sign      Days of Exercise per Week: 4 days      Minutes of Exercise per Session: 30 min   Stress: No Stress Concern Present (2/27/2024)    Received from [facility]    [place] [employer] Occup[employer] - Occupational Stress Questionnaire      Feeling of Stress : Only a little   Social Connections: Moderately Integrated (2/27/2024)     Received from [facility]    Social Connection and Isolation Panel [NHANES]      Frequency of Communication with Friends and Family: More than three times a week      Frequency of Social Gatherings with Friends and Family: Twice a week      Attends Oriental orthodox Services: More than 4 times per year      Active Member of Clubs or Organizations: Yes      Attends Club or Organization Meetings: More than 4 times per year      Marital Status: Never    Interpersonal Safety: Not At Risk (11/4/2024)    Received from [facility] IP Custom IPV      Do you feel UNSAFE in any of your personal relationships with your family members or any other acquaintances?: No   Housing Stability: Low Risk  (2/27/2024)    Received from [facility]    Housing Stability Vital Sign      Unable to Pay for Housing in the Last Year: No      Number of Places Lived in the Last Year: 2      Unstable Housing in the Last Year: No         I have reviewed the patients prescribed medications:    Current Facility-Administered Medications:      acetaminophen (TYLENOL) tablet 650 mg, 650 mg, Oral, Once, Francisco Javier Katz MD     LORazepam (ATIVAN) injection 1 mg, 1 mg, Intravenous, Once, Francisco Javier Katz MD    Current Outpatient Medications:      calcium carbonate-vitamin D (CALTRATE) 600-10 MG-MCG per tablet, Take 1 tablet by mouth., Disp: , Rfl:      Cyanocobalamin 50 MCG TABS, Take 50 mcg by mouth., Disp: , Rfl:      hydrOXYzine HCl (ATARAX) 25 MG tablet, Take 1 tablet (25 mg) by mouth 3 times daily as needed for itching., Disp: 15 tablet, Rfl: 1     Lactobacillus (ACIDOPHILUS) tablet, Take by mouth., Disp: , Rfl:      sertraline (ZOLOFT) 100 MG tablet, Take 1 tablet (100 mg) by mouth daily., Disp: 90 tablet, Rfl: 4    Allergies:  Allergies   Allergen Reactions     Bee Venom Anaphylaxis     Droperidol Anxiety     Acetaminophen Nausea and Vomiting     Other reaction(s): Gastrointestinal  Mild per  "pt.     Lanolin      Latex Other (See Comments)     LATEX PRECAUTIONS due to hx of seasonal allergies and frequent exposure to latex     Penicillins Rash     PN: LW Reaction: rash/facial swelling         Vitals:    11/13/24 2253   BP: 110/60   Pulse: 120   Resp: 26   Temp: (!) 100.7  F (38.2  C)   SpO2: 99%   Weight: 125.2 kg (276 lb)   Height: 1.651 m (5' 5\")       Physical Exam  Vitals: /60   Pulse 120   Temp (!) 100.7  F (38.2  C)   Resp 26   Ht 1.651 m (5' 5\")   Wt 125.2 kg (276 lb)   LMP 09/29/2024 (Exact Date)   SpO2 99%   BMI 45.93 kg/m    Constitutional: awake, NAD  Eyes: No conjunctival injection and normal lids, PERRL, EOMI  ENT: external nose and ears atraumatic  Neck: Symmetric, trachea midline, Supple  CV: RRR, no murmurs appreciated.  Pulm: Unlabored respiratory effort, good air movement, CTAB, no w/c/r appreciated.  GI: Soft, nontender, nondistended.  No rebound or guarding  MSK: No deformities.  No cyanosis.  Neuro: AOx4, normal speech, following commands, grossly symmetric strength in all extremities.  Cranial nerves III-XII grossly intact.    Skin: warm & dry, no rashes or lesions  Psych: Appropriate mood & affect    Past medical history, past surgical history, problem list, family history, social history, medication list, and allergies were reviewed as documented in epic snapshot.  Relevant review of systems are documented within the HPI above.    Complexity of the Problems Addressed  5 (High) - 1 or more chronic illnesses with severe exacerbation, progression, or side effects of treatment or 1 acute chronic illness or injury that poses threat to live or bodily function    Complexity of Data  I have reviewed the following pertinent historical labs, diagnoses, tests, and/or imaging which contribute to complexity of this patient's care.   5 - (Extensive) - (2 of three above)    Complication Risk  Based on my interpretation of the current labs, historical tests and/or external tests. " Patient does have a risk level as stated below of morbidity, threat to life, and bodily function, and severe exacerbation if not treated.   5 - High (drug therapy requiring intensive monitoring, elective major surgery with risk factors, emergency major surgery, hospitalization or excalation of hospital level care, decision not to resuscitate or to de-escalate care because of poor prognosis, parenteral controlled substances)      ED Events, Labs and Imaging:  ED Course as of 11/14/24 0124   Wed Nov 13, 2024   2304 EKG interpreted by me reveals sinus rhythm, some artifact, ultimately no appreciable acute ischemia, ventricular to 118   2315 WBC: 8.5   2315 Hemoglobin(!): 10.0   2327 HCG Qualitative Serum: Negative   2337 Troponin T, High Sensitivity: <6   2357 N-Terminal Pro BNP Inpatient: 43   2357 CK Total: 44   2357 Procalcitonin: 0.13   2357 CRP Inflammation(!): 66.58   2357 Lipase: 19   2357 Sodium(!): 134   2357 Potassium: 4.4   2357 Creatinine: 0.86   Thu Nov 14, 2024   0026 Ph Venous: 7.39   0026 PCO2 Venous(!): 35   0026 Bicarbonate Venous: 21   0038 Influenza A: Negative   0038 Influenza B: Negative   0038 Resp Syncytial Virus: Negative   0038 SARS CoV2 PCR: Negative   0122 CT PE  1.   Given mild motion, no obvious pulmonary emboli.   2.   No aortic dissection.   3.   Right upper lobe and left lower lobe infiltrates concerning for multifocal   pneumonia.   4.   Bilateral hilar adenopathy. This is likely reactive given lung   infiltrates.             Isaiah Katz MD  Emergency Medicine    Dictation Disclaimer: Some notes are completed with voice-recognition dictation software. Errors are generally corrected in real time. Please contact me via Epic staff message if you note any errors requiring clarification.     Francisco Javier Katz MD  11/14/24 0124

## 2024-11-14 NOTE — TELEPHONE ENCOUNTER
"11/13/24  ED for bilat pneumonia.  (Chest pain and SOB)    Omnicef and Doxy prescribed.     \"Other than ibuprofen, is there anything else I can get to help with symptoms?  I'm in so much pain-I can't even sleep more than an hour- pain in back, chest, head\".     Tylenol noted as allergy in chart: nausea and vomiting.     My Thoughts:  Radha wouldn't prescribe anything without seeing you.  Normally we would recommend alternating Tylenol and Ibuprofen but it looks like you have an allergy to Tylenol.      Conservative treatments at home would include:    Ibuprofen  Alternating heating pad and ice packs to back  Topical agents like Bio Freeze, Bengay, Icy Hot  Lidocaine patches for back (over-the-counter)  Focusing on hydration (dehydration can often worsen headaches)  Rest    Gunjan England RN on 11/14/2024 at 12:33 PM            "

## 2024-11-14 NOTE — TELEPHONE ENCOUNTER
Agree with note. Consider alleve/naproxen rather than ibuprofen to see if that will help. Radha Smiley, SINCERE CNP on 11/14/2024 at 12:46 PM

## 2024-11-14 NOTE — DISCHARGE INSTRUCTIONS
Follow-up with your primary care doctor the next 2 to 3 days.    Take cefdinir and doxycycline, these are antibiotics.  Drink plenty of fluids.    For mild to moderate pain or fever you can take ibuprofen and/or Tylenol if you do not have allergies to these.    You have any worsening or concerning symptoms please call 911 or return to the emergency department immediately.

## 2024-11-14 NOTE — ED NOTES
Pt ambulated to the bathroom. Pt reports feeling SOB with ambulation. Pt denies increased pain at this time.

## 2024-11-14 NOTE — ED TRIAGE NOTES
Pt arrives via private vehicle from home with daughter for c/o chest tightness and SOB. Pt states she has been having fever and chills. Symptoms started Monday night and became significantly worse today.      Triage Assessment (Adult)       Row Name 11/13/24 5143          Triage Assessment    Airway WDL WDL        Respiratory WDL    Respiratory WDL X        Skin Circulation/Temperature WDL    Skin Circulation/Temperature WDL WDL        Cardiac WDL    Cardiac WDL X        Peripheral/Neurovascular WDL    Peripheral Neurovascular WDL WDL        Cognitive/Neuro/Behavioral WDL    Cognitive/Neuro/Behavioral WDL WDL

## 2024-11-18 ENCOUNTER — HOSPITAL ENCOUNTER (OUTPATIENT)
Dept: INFUSION THERAPY | Facility: OTHER | Age: 34
Discharge: HOME | End: 2024-11-18
Admitting: FAMILY MEDICINE
Payer: COMMERCIAL

## 2024-11-18 VITALS
TEMPERATURE: 97.5 F | RESPIRATION RATE: 18 BRPM | SYSTOLIC BLOOD PRESSURE: 121 MMHG | DIASTOLIC BLOOD PRESSURE: 53 MMHG | HEART RATE: 68 BPM

## 2024-11-18 DIAGNOSIS — E61.1 IRON DEFICIENCY: Primary | ICD-10-CM

## 2024-11-18 PROCEDURE — 96365 THER/PROPH/DIAG IV INF INIT: CPT

## 2024-11-18 PROCEDURE — 258N000003 HC RX IP 258 OP 636: Performed by: NURSE PRACTITIONER

## 2024-11-18 PROCEDURE — 250N000011 HC RX IP 250 OP 636: Performed by: NURSE PRACTITIONER

## 2024-11-18 PROCEDURE — 96366 THER/PROPH/DIAG IV INF ADDON: CPT

## 2024-11-18 RX ORDER — METHYLPREDNISOLONE SODIUM SUCCINATE 125 MG/2ML
125 INJECTION INTRAMUSCULAR; INTRAVENOUS
Start: 2024-11-19

## 2024-11-18 RX ORDER — HEPARIN SODIUM (PORCINE) LOCK FLUSH IV SOLN 100 UNIT/ML 100 UNIT/ML
5 SOLUTION INTRAVENOUS
OUTPATIENT
Start: 2024-11-19

## 2024-11-18 RX ORDER — EPINEPHRINE 1 MG/ML
0.3 INJECTION, SOLUTION, CONCENTRATE INTRAVENOUS EVERY 5 MIN PRN
OUTPATIENT
Start: 2024-11-19

## 2024-11-18 RX ORDER — ALBUTEROL SULFATE 90 UG/1
1-2 INHALANT RESPIRATORY (INHALATION)
Start: 2024-11-19

## 2024-11-18 RX ORDER — ALBUTEROL SULFATE 0.83 MG/ML
2.5 SOLUTION RESPIRATORY (INHALATION)
OUTPATIENT
Start: 2024-11-19

## 2024-11-18 RX ORDER — MEPERIDINE HYDROCHLORIDE 50 MG/ML
25 INJECTION INTRAMUSCULAR; INTRAVENOUS; SUBCUTANEOUS EVERY 30 MIN PRN
OUTPATIENT
Start: 2024-11-19

## 2024-11-18 RX ORDER — DIPHENHYDRAMINE HYDROCHLORIDE 50 MG/ML
50 INJECTION INTRAMUSCULAR; INTRAVENOUS
Start: 2024-11-19

## 2024-11-18 RX ORDER — HEPARIN SODIUM,PORCINE 10 UNIT/ML
5-20 VIAL (ML) INTRAVENOUS DAILY PRN
OUTPATIENT
Start: 2024-11-19

## 2024-11-18 RX ADMIN — IRON SUCROSE 300 MG: 20 INJECTION, SOLUTION INTRAVENOUS at 08:36

## 2024-11-18 NOTE — NURSING NOTE
Infusion Nursing Note:  Gabbie Tavares presents today for Venofer 2/3.    Patient seen by provider today: No   present during visit today: Not Applicable.    Note: N/A.      Intravenous Access:  Peripheral IV placed.    Treatment Conditions:  Not Applicable.      Post Infusion Assessment:  Patient tolerated infusion without incident.  Blood return noted pre and post infusion.  Site patent and intact, free from redness, edema or discomfort.  No evidence of extravasations.  Access discontinued per protocol.       Discharge Plan:   Discharge instructions reviewed with: Patient.  Patient and/or family verbalized understanding of discharge instructions and all questions answered.  Copy of AVS declined. Patient will return 11-25-24 for next appointment.  AVS to patient via [employer].   Patient discharged in stable condition accompanied by: self.  Departure Mode: Ambulatory.      Jennifer Hansen RN

## 2024-11-25 ENCOUNTER — HOSPITAL ENCOUNTER (OUTPATIENT)
Dept: INFUSION THERAPY | Facility: OTHER | Age: 34
Discharge: HOME | End: 2024-11-25
Admitting: FAMILY MEDICINE
Payer: COMMERCIAL

## 2024-11-25 VITALS
HEART RATE: 69 BPM | SYSTOLIC BLOOD PRESSURE: 109 MMHG | TEMPERATURE: 97.5 F | DIASTOLIC BLOOD PRESSURE: 59 MMHG | RESPIRATION RATE: 17 BRPM

## 2024-11-25 DIAGNOSIS — E61.1 IRON DEFICIENCY: Primary | ICD-10-CM

## 2024-11-25 PROCEDURE — 96366 THER/PROPH/DIAG IV INF ADDON: CPT

## 2024-11-25 PROCEDURE — 250N000011 HC RX IP 250 OP 636: Performed by: NURSE PRACTITIONER

## 2024-11-25 PROCEDURE — 96365 THER/PROPH/DIAG IV INF INIT: CPT

## 2024-11-25 PROCEDURE — 258N000003 HC RX IP 258 OP 636: Performed by: NURSE PRACTITIONER

## 2024-11-25 RX ORDER — DIPHENHYDRAMINE HYDROCHLORIDE 50 MG/ML
50 INJECTION INTRAMUSCULAR; INTRAVENOUS
Status: CANCELLED
Start: 2024-11-26

## 2024-11-25 RX ORDER — ALBUTEROL SULFATE 90 UG/1
1-2 INHALANT RESPIRATORY (INHALATION)
Status: CANCELLED
Start: 2024-11-26

## 2024-11-25 RX ORDER — HEPARIN SODIUM (PORCINE) LOCK FLUSH IV SOLN 100 UNIT/ML 100 UNIT/ML
5 SOLUTION INTRAVENOUS
Status: CANCELLED | OUTPATIENT
Start: 2024-11-26

## 2024-11-25 RX ORDER — HEPARIN SODIUM,PORCINE 10 UNIT/ML
5-20 VIAL (ML) INTRAVENOUS DAILY PRN
Status: CANCELLED | OUTPATIENT
Start: 2024-11-26

## 2024-11-25 RX ORDER — MEPERIDINE HYDROCHLORIDE 50 MG/ML
25 INJECTION INTRAMUSCULAR; INTRAVENOUS; SUBCUTANEOUS EVERY 30 MIN PRN
Status: CANCELLED | OUTPATIENT
Start: 2024-11-26

## 2024-11-25 RX ORDER — EPINEPHRINE 1 MG/ML
0.3 INJECTION, SOLUTION, CONCENTRATE INTRAVENOUS EVERY 5 MIN PRN
Status: CANCELLED | OUTPATIENT
Start: 2024-11-26

## 2024-11-25 RX ORDER — METHYLPREDNISOLONE SODIUM SUCCINATE 125 MG/2ML
125 INJECTION INTRAMUSCULAR; INTRAVENOUS
Status: CANCELLED
Start: 2024-11-26

## 2024-11-25 RX ORDER — ALBUTEROL SULFATE 0.83 MG/ML
2.5 SOLUTION RESPIRATORY (INHALATION)
Status: CANCELLED | OUTPATIENT
Start: 2024-11-26

## 2024-11-25 RX ADMIN — IRON SUCROSE 300 MG: 20 INJECTION, SOLUTION INTRAVENOUS at 08:52

## 2024-11-25 NOTE — PATIENT INSTRUCTIONS
EDUCATION POST INFUSION  Call the triage nurse at your clinic or seek medical attention if you have chills and/or temperature greater than or equal to 100.5, uncontrolled nausea/vomiting, diarrhea, constipation, dizziness, shortness of breath, chest pain, heart palpitations, weakness or any other new or concerning symptoms, questions or concerns.  If you are having any concerning symptom, if you are unsure if you should get your next infusion or wish to speak to a provider before your next infusion, please call your care coordinator or triage nurse at your clinic to notify them so we can adequately serve you.

## 2024-11-25 NOTE — NURSING NOTE
Infusion Nursing Note:  Gabbie Tavares presents today for iron 3/3.    Patient seen by provider today: No   present during visit today: Not Applicable.    Note: N/A.      Intravenous Access:  Peripheral IV placed.    Treatment Conditions:  Not Applicable.      Post Infusion Assessment:  Patient tolerated infusion without incident.  Blood return noted pre and post infusion.  Site patent and intact, free from redness, edema or discomfort.  No evidence of extravasations.  Access discontinued per protocol.       Discharge Plan:   Patient and/or family verbalized understanding of discharge instructions and all questions answered.  AVS to patient via [employer].  Patient will return 12/5/24 for next appointment.   Patient discharged in stable condition accompanied by: self.  Departure Mode: Ambulatory.      Sully Martínez RN

## 2024-11-30 ENCOUNTER — HEALTH MAINTENANCE LETTER (OUTPATIENT)
Age: 34
End: 2024-11-30

## 2024-12-05 ENCOUNTER — OFFICE VISIT (OUTPATIENT)
Dept: FAMILY MEDICINE | Facility: OTHER | Age: 34
End: 2024-12-05
Attending: NURSE PRACTITIONER
Payer: COMMERCIAL

## 2024-12-05 DIAGNOSIS — E61.1 IRON DEFICIENCY: ICD-10-CM

## 2024-12-05 DIAGNOSIS — D56.0 ALPHA-THALASSEMIA (H): ICD-10-CM

## 2024-12-05 PROCEDURE — 97802 MEDICAL NUTRITION INDIV IN: CPT | Performed by: DIETITIAN, REGISTERED

## 2024-12-05 NOTE — PROGRESS NOTES
"Gabbie is here today for MNT related to desired wt loss, obesity.     PCP: Radha Smiley    Gabbie has been trying to lose weight and has made several lifestyle adjustments including cutting out sweetened beverages and watching her portions sizes. She has lost ~33#. Her goal wt is around 200-220#, she currently reported weighing 280#. She often skips breakfast, may not always have time for lunch but may snack on protein bars or shakes. She makes dinner for her, her partner, and their 12 children. There are days when she does not get a chance to eat and by dinner she is nauseous. She aims for ~1900 kcal per day but often does not eat that amount.     Dx:   Encounter Diagnoses   Name Primary?    Iron deficiency     Alpha-thalassemia (H)    Obesity with elevated BMI:Estimated body mass index is 45.93 kg/m  as calculated from the following:    Height as of 11/13/24: 1.651 m (5' 5\").    Weight as of 11/13/24: 125.2 kg (276 lb).    Diet: 2695-3821 rina/day, 73 g protein, 25-35 g fiber per day.     Education today: discussed strategies for wt loss including portion control, smaller plates, healthy snacking, meal timing and getting enough calories. Reviewed mediterranean lifestyle changes and focusing on minimally processed foods.     Materials provided: sample meal plans, estimated needs and recipes, mediterranean eating guide, guide to good nutrition, nutrition guide for wt control, fiber content of foods list, snack ideas, dish up a healthy meal.     Goals/plan: Gabbie will monitor her usual intakes for 3-4 days, including a weekend day and compare to her estimated needs. She will develop a \"menu\" and make changes to more closely align with recommendations for estimated needs. She will start to snack prep along with her meal prepping. She will add a small breakfast. She will continue to sit down and eat uninterrupted as a family at meals as often as possible, taking at least 20 min.     Gabbie stated " understanding and had no questions at this time.     Follow up encouraged 3-6 months.     Time spent: 43 min.     Jannet Rossi RD on 12/5/2024 at 10:05 AM            Answers submitted by the patient for this visit:  General Questionnaire (Submitted on 12/5/2024)  Chief Complaint: Chronic problems general questions HPI Form  What is the reason for your visit today? : Talk about diet  How many servings of fruits and vegetables do you eat daily?: 4 or more  On average, how many sweetened beverages do you drink each day (Examples: soda, juice, sweet tea, etc.  Do NOT count diet or artificially sweetened beverages)?: 0  How many minutes a day do you exercise enough to make your heart beat faster?: 10 to 19  How many days a week do you exercise enough to make your heart beat faster?: 3 or less  How many days per week do you miss taking your medication?: 0  Questionnaire about: Chronic problems general questions HPI Form (Submitted on 12/5/2024)  Chief Complaint: Chronic problems general questions HPI Form

## 2024-12-30 ENCOUNTER — E-VISIT (OUTPATIENT)
Dept: FAMILY MEDICINE | Facility: OTHER | Age: 34
End: 2024-12-30
Payer: COMMERCIAL

## 2024-12-30 ENCOUNTER — MYC MEDICAL ADVICE (OUTPATIENT)
Dept: FAMILY MEDICINE | Facility: OTHER | Age: 34
End: 2024-12-30
Payer: COMMERCIAL

## 2024-12-30 DIAGNOSIS — B00.9 HERPES SIMPLEX VIRUS INFECTION: Primary | ICD-10-CM

## 2024-12-30 RX ORDER — VALACYCLOVIR HYDROCHLORIDE 500 MG/1
500 TABLET, FILM COATED ORAL 2 TIMES DAILY
Qty: 6 TABLET | Refills: 0 | Status: SHIPPED | OUTPATIENT
Start: 2024-12-30 | End: 2025-01-02

## 2024-12-30 NOTE — TELEPHONE ENCOUNTER
Is she having genital or oral? E-visit would be good-in person would be better. SINCERE Nina CNP on 12/30/2024 at 11:54 AM

## 2024-12-30 NOTE — PATIENT INSTRUCTIONS
Thank you for choosing us for your care. I have placed an order for a prescription so that you can start treatment. View your full visit summary for details by clicking on the link below. Your pharmacist will able to address any questions you may have about the medication.     If you re not feeling better within 2-3 days, please schedule an appointment.  You can schedule an appointment right here in [facility], or call [phone]  If the visit is for the same symptoms as your eVisit, we ll refund the cost of your eVisit if seen within seven days.

## 2025-01-24 ENCOUNTER — LAB (OUTPATIENT)
Dept: LAB | Facility: OTHER | Age: 35
End: 2025-01-24
Attending: NURSE PRACTITIONER
Payer: COMMERCIAL

## 2025-01-24 DIAGNOSIS — E61.1 IRON DEFICIENCY: Primary | ICD-10-CM

## 2025-01-24 LAB
FERRITIN SERPL-MCNC: 36 NG/ML (ref 6–175)
HOLD SPECIMEN: NORMAL
IRON BINDING CAPACITY (ROCHE): 264 UG/DL (ref 240–430)
IRON SATN MFR SERPL: 14 % (ref 15–46)
IRON SERPL-MCNC: 38 UG/DL (ref 37–145)

## 2025-01-24 PROCEDURE — 83550 IRON BINDING TEST: CPT | Mod: ZL

## 2025-01-24 PROCEDURE — 82728 ASSAY OF FERRITIN: CPT | Mod: ZL

## 2025-01-24 PROCEDURE — 83540 ASSAY OF IRON: CPT | Mod: ZL

## 2025-01-24 PROCEDURE — 36415 COLL VENOUS BLD VENIPUNCTURE: CPT | Mod: ZL

## 2025-01-27 ENCOUNTER — MYC MEDICAL ADVICE (OUTPATIENT)
Dept: FAMILY MEDICINE | Facility: OTHER | Age: 35
End: 2025-01-27
Payer: COMMERCIAL

## 2025-01-28 NOTE — TELEPHONE ENCOUNTER
Okay to do iron infusions again. No need for hematology. SINCERE Nina CNP on 1/28/2025 at 9:50 AM

## 2025-01-28 NOTE — TELEPHONE ENCOUNTER
"Has seen Hematology before- they recommended iron infusions due to Thalassemia.     You last ordered Iron Sucrose (Venofer) 300mg x3 doses for iron deficiency.    Received on 11/11/24, 11/18/24, 11/25/24    Started preparing Iron Therapy Plan for you to review/sign.    Stop sign was \"if you want her assessed for home injections\".      Let me know if you want me to patricia up a Hematology Referral or ask patient if she wants one.     ____________________________________________________________________        Ginger,     Your iron levels are low again. Are you taking any oral supplementation or do you want the infusions ordered again? Have you ever seen hematology?     SINCERE Nina CNP on 1/27/2025 at 12:25 PM   Written by SINCERE Crespo CNP on 1/27/2025 12:25 PM CST  Seen by patient Gabbie TIM Chaitanya on 1/27/2025 12:35 PM    Gunjan England RN on 1/28/2025 at 9:25 AM    "

## 2025-02-07 ENCOUNTER — HOSPITAL ENCOUNTER (EMERGENCY)
Facility: OTHER | Age: 35
Discharge: HOME | End: 2025-02-07
Attending: EMERGENCY MEDICINE | Admitting: EMERGENCY MEDICINE
Payer: MEDICAID

## 2025-02-07 VITALS
DIASTOLIC BLOOD PRESSURE: 76 MMHG | SYSTOLIC BLOOD PRESSURE: 119 MMHG | HEIGHT: 65 IN | HEART RATE: 73 BPM | OXYGEN SATURATION: 100 % | BODY MASS INDEX: 45.32 KG/M2 | WEIGHT: 272 LBS | TEMPERATURE: 97 F | RESPIRATION RATE: 22 BRPM

## 2025-02-07 DIAGNOSIS — R51.9 NONINTRACTABLE HEADACHE, UNSPECIFIED CHRONICITY PATTERN, UNSPECIFIED HEADACHE TYPE: ICD-10-CM

## 2025-02-07 PROBLEM — B00.9 HSV (HERPES SIMPLEX VIRUS) INFECTION: Status: ACTIVE | Noted: 2022-03-30

## 2025-02-07 PROBLEM — Z87.891 HISTORY OF NICOTINE USE: Status: ACTIVE | Noted: 2022-11-04

## 2025-02-07 PROBLEM — G43.909 MIGRAINE HEADACHE: Status: ACTIVE | Noted: 2018-03-21

## 2025-02-07 PROBLEM — Z86.2 HISTORY OF THALASSEMIA MINOR: Status: ACTIVE | Noted: 2017-12-29

## 2025-02-07 PROBLEM — E11.610 TYPE 2 DIABETES MELLITUS WITH DIABETIC NEUROPATHIC ARTHROPATHY, WITHOUT LONG-TERM CURRENT USE OF INSULIN (H): Status: ACTIVE | Noted: 2022-03-30

## 2025-02-07 PROBLEM — F41.1 GENERALIZED ANXIETY DISORDER WITH PANIC ATTACKS: Status: ACTIVE | Noted: 2022-09-23

## 2025-02-07 PROBLEM — F41.0 GENERALIZED ANXIETY DISORDER WITH PANIC ATTACKS: Status: ACTIVE | Noted: 2022-09-23

## 2025-02-07 PROBLEM — L03.211 CELLULITIS OF SUBMENTAL SPACE: Status: ACTIVE | Noted: 2022-02-21

## 2025-02-07 PROBLEM — B00.3 HERPES SIMPLEX MENINGITIS: Status: ACTIVE | Noted: 2022-03-30

## 2025-02-07 PROBLEM — R87.810 CERVICAL HIGH RISK HPV (HUMAN PAPILLOMAVIRUS) TEST POSITIVE: Status: ACTIVE | Noted: 2022-01-04

## 2025-02-07 LAB
ALBUMIN SERPL BCG-MCNC: 3.9 G/DL (ref 3.5–5.2)
ALP SERPL-CCNC: 84 U/L (ref 40–150)
ALT SERPL W P-5'-P-CCNC: 15 U/L (ref 0–50)
ANION GAP SERPL CALCULATED.3IONS-SCNC: 10 MMOL/L (ref 7–15)
AST SERPL W P-5'-P-CCNC: 20 U/L (ref 0–45)
BASOPHILS # BLD AUTO: 0 10E3/UL (ref 0–0.2)
BASOPHILS NFR BLD AUTO: 0 %
BILIRUB SERPL-MCNC: 0.2 MG/DL
BUN SERPL-MCNC: 11.2 MG/DL (ref 6–20)
CALCIUM SERPL-MCNC: 8.8 MG/DL (ref 8.8–10.4)
CHLORIDE SERPL-SCNC: 104 MMOL/L (ref 98–107)
CREAT SERPL-MCNC: 0.78 MG/DL (ref 0.51–0.95)
EGFRCR SERPLBLD CKD-EPI 2021: >90 ML/MIN/1.73M2
EOSINOPHIL # BLD AUTO: 0.1 10E3/UL (ref 0–0.7)
EOSINOPHIL NFR BLD AUTO: 2 %
ERYTHROCYTE [DISTWIDTH] IN BLOOD BY AUTOMATED COUNT: 19.2 % (ref 10–15)
GLUCOSE SERPL-MCNC: 86 MG/DL (ref 70–99)
HCO3 SERPL-SCNC: 24 MMOL/L (ref 22–29)
HCT VFR BLD AUTO: 36.7 % (ref 35–47)
HGB BLD-MCNC: 11.3 G/DL (ref 11.7–15.7)
HOLD SPECIMEN: NORMAL
IMM GRANULOCYTES # BLD: 0.1 10E3/UL
IMM GRANULOCYTES NFR BLD: 1 %
LYMPHOCYTES # BLD AUTO: 1.5 10E3/UL (ref 0.8–5.3)
LYMPHOCYTES NFR BLD AUTO: 22 %
MCH RBC QN AUTO: 21.8 PG (ref 26.5–33)
MCHC RBC AUTO-ENTMCNC: 30.8 G/DL (ref 31.5–36.5)
MCV RBC AUTO: 71 FL (ref 78–100)
MONOCYTES # BLD AUTO: 0.5 10E3/UL (ref 0–1.3)
MONOCYTES NFR BLD AUTO: 7 %
NEUTROPHILS # BLD AUTO: 4.8 10E3/UL (ref 1.6–8.3)
NEUTROPHILS NFR BLD AUTO: 69 %
NRBC # BLD AUTO: 0 10E3/UL
NRBC BLD AUTO-RTO: 0 /100
PLATELET # BLD AUTO: 296 10E3/UL (ref 150–450)
POTASSIUM SERPL-SCNC: 4.1 MMOL/L (ref 3.4–5.3)
PROT SERPL-MCNC: 7 G/DL (ref 6.4–8.3)
RBC # BLD AUTO: 5.18 10E6/UL (ref 3.8–5.2)
SODIUM SERPL-SCNC: 138 MMOL/L (ref 135–145)
WBC # BLD AUTO: 6.9 10E3/UL (ref 4–11)

## 2025-02-07 PROCEDURE — 99283 EMERGENCY DEPT VISIT LOW MDM: CPT | Performed by: EMERGENCY MEDICINE

## 2025-02-07 PROCEDURE — 36415 COLL VENOUS BLD VENIPUNCTURE: CPT | Performed by: EMERGENCY MEDICINE

## 2025-02-07 PROCEDURE — 96375 TX/PRO/DX INJ NEW DRUG ADDON: CPT | Performed by: EMERGENCY MEDICINE

## 2025-02-07 PROCEDURE — 258N000003 HC RX IP 258 OP 636: Performed by: EMERGENCY MEDICINE

## 2025-02-07 PROCEDURE — 85025 COMPLETE CBC W/AUTO DIFF WBC: CPT | Performed by: EMERGENCY MEDICINE

## 2025-02-07 PROCEDURE — 250N000011 HC RX IP 250 OP 636: Performed by: EMERGENCY MEDICINE

## 2025-02-07 PROCEDURE — 82040 ASSAY OF SERUM ALBUMIN: CPT | Performed by: EMERGENCY MEDICINE

## 2025-02-07 PROCEDURE — 99284 EMERGENCY DEPT VISIT MOD MDM: CPT | Mod: 25 | Performed by: EMERGENCY MEDICINE

## 2025-02-07 PROCEDURE — 96374 THER/PROPH/DIAG INJ IV PUSH: CPT | Performed by: EMERGENCY MEDICINE

## 2025-02-07 RX ORDER — KETOROLAC TROMETHAMINE 30 MG/ML
30 INJECTION, SOLUTION INTRAMUSCULAR; INTRAVENOUS ONCE
Status: COMPLETED | OUTPATIENT
Start: 2025-02-07 | End: 2025-02-07

## 2025-02-07 RX ORDER — DIPHENHYDRAMINE HYDROCHLORIDE 50 MG/ML
25 INJECTION INTRAMUSCULAR; INTRAVENOUS ONCE
Status: COMPLETED | OUTPATIENT
Start: 2025-02-07 | End: 2025-02-07

## 2025-02-07 RX ADMIN — SODIUM CHLORIDE 1000 ML: 0.9 INJECTION, SOLUTION INTRAVENOUS at 09:02

## 2025-02-07 RX ADMIN — PROCHLORPERAZINE EDISYLATE 10 MG: 5 INJECTION INTRAMUSCULAR; INTRAVENOUS at 09:01

## 2025-02-07 RX ADMIN — DIPHENHYDRAMINE HYDROCHLORIDE 25 MG: 50 INJECTION, SOLUTION INTRAMUSCULAR; INTRAVENOUS at 09:01

## 2025-02-07 RX ADMIN — KETOROLAC TROMETHAMINE 30 MG: 30 INJECTION, SOLUTION INTRAMUSCULAR at 09:47

## 2025-02-07 ASSESSMENT — ACTIVITIES OF DAILY LIVING (ADL)
ADLS_ACUITY_SCORE: 41
ADLS_ACUITY_SCORE: 41

## 2025-02-07 ASSESSMENT — COLUMBIA-SUICIDE SEVERITY RATING SCALE - C-SSRS
6. HAVE YOU EVER DONE ANYTHING, STARTED TO DO ANYTHING, OR PREPARED TO DO ANYTHING TO END YOUR LIFE?: NO
1. IN THE PAST MONTH, HAVE YOU WISHED YOU WERE DEAD OR WISHED YOU COULD GO TO SLEEP AND NOT WAKE UP?: NO
2. HAVE YOU ACTUALLY HAD ANY THOUGHTS OF KILLING YOURSELF IN THE PAST MONTH?: NO

## 2025-02-07 ASSESSMENT — ENCOUNTER SYMPTOMS
ABDOMINAL PAIN: 0
FEVER: 0
CHEST TIGHTNESS: 0
CHILLS: 0
HEADACHES: 1
AGITATION: 0
DYSURIA: 0
CONFUSION: 0
NAUSEA: 0
VOMITING: 0
SHORTNESS OF BREATH: 0
BACK PAIN: 1

## 2025-02-07 NOTE — ED TRIAGE NOTES
Pt states that she started having a headache last night, now has pain in back and legs.  Pt is in obvious pain while walking.  She originally thought it was a tension headache from stress till the other pain started around 8 last night.  Pain is continuing to get worse.  It has been a gradual climb in her pain to levels that are uncontrolled now.       Triage Assessment (Adult)       Row Name 02/07/25 0822          Triage Assessment    Airway WDL WDL        Respiratory WDL    Respiratory WDL WDL        Skin Circulation/Temperature WDL    Skin Circulation/Temperature WDL WDL        Cardiac WDL    Cardiac WDL WDL        Peripheral/Neurovascular WDL    Peripheral Neurovascular WDL WDL        Cognitive/Neuro/Behavioral WDL    Cognitive/Neuro/Behavioral WDL WDL

## 2025-02-07 NOTE — ED PROVIDER NOTES
History     Chief Complaint   Patient presents with    Headache    Back Pain    Leg Pain     HPI  Gabbie Tavares is a 34 year old female who comes in with headache.  This began last night.  She describes a pressure across the whole top of her head towards the back of her head.  It is not worse 1 side more than the other.  No neck pain or stiffness, she states she does sometimes get muscle tension headaches but is not having any of that right now.  No pain at all in her neck and she has 3 range of motion in her neck.  Denies any lightheadedness or dizziness or nausea.  She is sensitive to light.  Is now having some pain in her mid low back going and going down her thighs.  Has a history of viral meningitis and she says some of this feels the same, however she had neck stiffness and pain at that time and she does not have that now.  Not feeling ill otherwise.    Allergies:  Allergies   Allergen Reactions    Bee Venom Anaphylaxis    Droperidol Anxiety    Acetaminophen Nausea and Vomiting     Other reaction(s): Gastrointestinal  Mild per pt.    Lanolin     Latex Other (See Comments)     LATEX PRECAUTIONS due to hx of seasonal allergies and frequent exposure to latex    Penicillins Rash     PN: LW Reaction: rash/facial swelling       Problem List:    Patient Active Problem List    Diagnosis Date Noted    Iron deficiency 10/04/2024     Priority: Medium    History of nicotine use 11/04/2022     Priority: Medium     As of 8.2022 1/2 PPD  Quit oct 2022      Generalized anxiety disorder with panic attacks 09/23/2022     Priority: Medium     Sertraline started sept 2022. Positive response. Sleeping better and feeling much better by Nov 2022. 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 Lumbar puncture done at the time of admission showed 513 white blood cells, with 95% lymphocytes. Meningitis PCR panel showed HSV-2. She was treated initially with ceftriaxone but this was discontinued as her CSF culture was negative as well as her meningitis panel for bacterial infection. The patient was treated initially with IV acyclovir, after her headache and neck pain resolved she was transitioned to oral acyclovir to complete a 14-day course. She was discharged home and had known headache or neck pain at the time of discharge.  Herpes simplex 2 genital infection  The patient had an STD evaluation this hospitalization which showed HSV-2, her HIV test was negative, chlamydia, gonorrhea, trichomonas were all negative. Hepatitis screening panel was also negative.  Procedures:  CSF puncture      HSV (herpes simplex virus) infection 03/30/2022     Priority: Medium    Type 2 diabetes mellitus with diabetic neuropathic arthropathy, without long-term current use of insulin (H) 03/30/2022     Priority: Medium    Cellulitis of submental space 02/21/2022     Priority: Medium    Cervical high risk HPV (human papillomavirus) test positive 01/04/2022     Priority: Medium     5.10.21 wnl HPVother+  11.4.22 wnl HPVother+  01/10/23 Colpo:  benitez 1  2/2024. Wnl. Hpv negative. Rpt 3 years      Morbid obesity (H) 05/10/2021     Priority: Medium    Migraine headache 03/21/2018     Priority: Medium    History of thalassemia minor 12/29/2017     Priority: Medium     Sent for records to confirm      Preeclampsia 07/24/2015     Priority: Medium    Headache 02/16/2015     Priority: Medium    Nonallopathic lesion of thoracic region 02/16/2015     Priority: Medium    Cholecystitis 05/30/2014     Priority: Medium    Temporomandibular joint disorder 10/15/2007     Priority: Medium     IMO Update 10/11      Iron deficiency anemia 01/20/2006     Priority: Medium     Iron deficiency.  Check hemoglobins with well child  examinations.  Iron deficiency.  Check hemoglobins with well child examinations.          Past Medical History:    Past Medical History:   Diagnosis Date    No pertinent past medical history        Past Surgical History:    Past Surgical History:   Procedure Laterality Date    GALLBLADDER SURGERY  2014    [place] [id number]       Family History:    Family History   Problem Relation Age of Onset    No Known Problems Mother     No Known Problems Father     No Known Problems Sister     No Known Problems Brother     No Known Problems Brother     No Known Problems Brother     No Known Problems Brother     No Known Problems Brother     No Known Problems Brother     No Known Problems Sister     No Known Problems Sister     No Known Problems Son     No Known Problems Daughter        Social History:  Marital Status:  Single [1]  Social History     Tobacco Use    Smoking status: Former     Current packs/day: 0.50     Types: Cigarettes    Smokeless tobacco: Never   Substance Use Topics    Alcohol use: Not Currently    Drug use: No        Medications:    calcium carbonate-vitamin D (CALTRATE) 600-10 MG-MCG per tablet  Cyanocobalamin 50 MCG TABS  hydrOXYzine HCl (ATARAX) 25 MG tablet  Lactobacillus (ACIDOPHILUS) tablet  sertraline (ZOLOFT) 100 MG tablet  valACYclovir (VALTREX) 500 MG tablet          Review of Systems   Constitutional:  Negative for chills and fever.   HENT:  Negative for congestion.    Eyes:  Negative for visual disturbance.   Respiratory:  Negative for chest tightness and shortness of breath.    Cardiovascular:  Negative for chest pain.   Gastrointestinal:  Negative for abdominal pain, nausea and vomiting.   Genitourinary:  Negative for dysuria.   Musculoskeletal:  Positive for back pain.   Skin:  Negative for rash.   Neurological:  Positive for headaches.   Psychiatric/Behavioral:  Negative for agitation and confusion.        Physical Exam   BP: 119/76  Pulse: 73  Temp: 97  F (36.1  C)  Resp:  "22  Height: 165.1 cm (5' 5\")  Weight: 123.4 kg (272 lb)  SpO2: 100 %      Physical Exam  Vitals and nursing note reviewed.   Constitutional:       Appearance: Normal appearance.   HENT:      Head: Normocephalic and atraumatic.      Mouth/Throat:      Mouth: Mucous membranes are moist.   Eyes:      Conjunctiva/sclera: Conjunctivae normal.   Cardiovascular:      Rate and Rhythm: Normal rate and regular rhythm.      Heart sounds: Normal heart sounds.   Pulmonary:      Effort: Pulmonary effort is normal.      Breath sounds: Normal breath sounds.   Abdominal:      Palpations: Abdomen is soft.   Skin:     General: Skin is warm and dry.   Neurological:      General: No focal deficit present.      Mental Status: She is alert and oriented to person, place, and time.   Psychiatric:         Mood and Affect: Mood normal.         Behavior: Behavior normal.         ED Course        Procedures                Results for orders placed or performed during the hospital encounter of 02/07/25 (from the past 24 hours)   Extra Tube ([place] Draw)    Narrative    The following orders were created for panel order Extra Tube ([place] Draw).  Procedure                               Abnormality         Status                     ---------                               -----------         ------                     Extra Blue Top Tube[[id number]]                              Final result               Extra Red Top Tube[[id number]]                               Final result               Extra Green Top (Lithium...[[id number]]                                                 Extra Purple Top Tube[[id number]]                                                       Extra Green Top (Lithium...[[id number]]                      Final result                 Please view results for these tests on the individual orders.   Extra Blue Top Tube   Result Value Ref Range    Hold Specimen JIC    Extra Red Top Tube   Result Value Ref Range    Hold Specimen JIC  "   Extra Green Top (Lithium Heparin) ON ICE   Result Value Ref Range    Hold Specimen JI    CBC with platelets differential    Narrative    The following orders were created for panel order CBC with platelets differential.  Procedure                               Abnormality         Status                     ---------                               -----------         ------                     CBC with platelets and d...[[id number]]  Abnormal            Final result                 Please view results for these tests on the individual orders.   Comprehensive metabolic panel   Result Value Ref Range    Sodium 138 135 - 145 mmol/L    Potassium 4.1 3.4 - 5.3 mmol/L    Carbon Dioxide (CO2) 24 22 - 29 mmol/L    Anion Gap 10 7 - 15 mmol/L    Urea Nitrogen 11.2 6.0 - 20.0 mg/dL    Creatinine 0.78 0.51 - 0.95 mg/dL    GFR Estimate >90 >60 mL/min/1.73m2    Calcium 8.8 8.8 - 10.4 mg/dL    Chloride 104 98 - 107 mmol/L    Glucose 86 70 - 99 mg/dL    Alkaline Phosphatase 84 40 - 150 U/L    AST 20 0 - 45 U/L    ALT 15 0 - 50 U/L    Protein Total 7.0 6.4 - 8.3 g/dL    Albumin 3.9 3.5 - 5.2 g/dL    Bilirubin Total 0.2 <=1.2 mg/dL   CBC with platelets and differential   Result Value Ref Range    WBC Count 6.9 4.0 - 11.0 10e3/uL    RBC Count 5.18 3.80 - 5.20 10e6/uL    Hemoglobin 11.3 (L) 11.7 - 15.7 g/dL    Hematocrit 36.7 35.0 - 47.0 %    MCV 71 (L) 78 - 100 fL    MCH 21.8 (L) 26.5 - 33.0 pg    MCHC 30.8 (L) 31.5 - 36.5 g/dL    RDW 19.2 (H) 10.0 - 15.0 %    Platelet Count 296 150 - 450 10e3/uL    % Neutrophils 69 %    % Lymphocytes 22 %    % Monocytes 7 %    % Eosinophils 2 %    % Basophils 0 %    % Immature Granulocytes 1 %    NRBCs per 100 WBC 0 <1 /100    Absolute Neutrophils 4.8 1.6 - 8.3 10e3/uL    Absolute Lymphocytes 1.5 0.8 - 5.3 10e3/uL    Absolute Monocytes 0.5 0.0 - 1.3 10e3/uL    Absolute Eosinophils 0.1 0.0 - 0.7 10e3/uL    Absolute Basophils 0.0 0.0 - 0.2 10e3/uL    Absolute Immature Granulocytes 0.1 <=0.4 10e3/uL     Absolute NRBCs 0.0 10e3/uL       Medications   sodium chloride 0.9% BOLUS 1,000 mL (1,000 mLs Intravenous $New Bag 2/7/25 0902)   prochlorperazine (COMPAZINE) injection 10 mg (10 mg Intravenous $Given 2/7/25 0901)   diphenhydrAMINE (BENADRYL) injection 25 mg (25 mg Intravenous $Given 2/7/25 0901)   ketorolac (TORADOL) injection 30 mg (30 mg Intravenous $Given 2/7/25 0947)       Assessments & Plan (with Medical Decision Making)     I have reviewed the nursing notes.    I have reviewed the findings, diagnosis, plan and need for follow up with the patient.      Headache as above.  Feeling significantly better with above interventions.  Still has about a 4 out of 10 pain but it was a 9 when she arrived.  She is feeling like she can go home and try to manage this at home at this point.  Lab work is reassuring.  Will discharge at this time return if worse.        New Prescriptions    No medications on file       Final diagnoses:   Nonintractable headache, unspecified chronicity pattern, unspecified headache type       2/7/2025   [facility]       Leo Carpenter MD  02/07/25 103

## 2025-02-09 ENCOUNTER — HEALTH MAINTENANCE LETTER (OUTPATIENT)
Age: 35
End: 2025-02-09

## 2025-02-09 ENCOUNTER — HOSPITAL ENCOUNTER (EMERGENCY)
Facility: OTHER | Age: 35
Discharge: OTHER INSTITUTION | End: 2025-02-10
Attending: EMERGENCY MEDICINE
Payer: MEDICAID

## 2025-02-09 DIAGNOSIS — G03.9 MENINGITIS: ICD-10-CM

## 2025-02-09 DIAGNOSIS — R51.9 NONINTRACTABLE EPISODIC HEADACHE, UNSPECIFIED HEADACHE TYPE: ICD-10-CM

## 2025-02-09 LAB
ALBUMIN SERPL BCG-MCNC: 3.9 G/DL (ref 3.5–5.2)
ALP SERPL-CCNC: 77 U/L (ref 40–150)
ALT SERPL W P-5'-P-CCNC: 13 U/L (ref 0–50)
ANION GAP SERPL CALCULATED.3IONS-SCNC: 12 MMOL/L (ref 7–15)
AST SERPL W P-5'-P-CCNC: 16 U/L (ref 0–45)
BASOPHILS # BLD AUTO: 0 10E3/UL (ref 0–0.2)
BASOPHILS NFR BLD AUTO: 0 %
BILIRUB SERPL-MCNC: 0.2 MG/DL
BUN SERPL-MCNC: 9.9 MG/DL (ref 6–20)
CALCIUM SERPL-MCNC: 9 MG/DL (ref 8.8–10.4)
CHLORIDE SERPL-SCNC: 100 MMOL/L (ref 98–107)
CREAT SERPL-MCNC: 0.84 MG/DL (ref 0.51–0.95)
EGFRCR SERPLBLD CKD-EPI 2021: >90 ML/MIN/1.73M2
EOSINOPHIL # BLD AUTO: 0.1 10E3/UL (ref 0–0.7)
EOSINOPHIL NFR BLD AUTO: 1 %
ERYTHROCYTE [DISTWIDTH] IN BLOOD BY AUTOMATED COUNT: 18.7 % (ref 10–15)
GLUCOSE SERPL-MCNC: 89 MG/DL (ref 70–99)
HCO3 SERPL-SCNC: 23 MMOL/L (ref 22–29)
HCT VFR BLD AUTO: 35.6 % (ref 35–47)
HGB BLD-MCNC: 11 G/DL (ref 11.7–15.7)
IMM GRANULOCYTES # BLD: 0.1 10E3/UL
IMM GRANULOCYTES NFR BLD: 1 %
LYMPHOCYTES # BLD AUTO: 1.1 10E3/UL (ref 0.8–5.3)
LYMPHOCYTES NFR BLD AUTO: 12 %
MCH RBC QN AUTO: 21.7 PG (ref 26.5–33)
MCHC RBC AUTO-ENTMCNC: 30.9 G/DL (ref 31.5–36.5)
MCV RBC AUTO: 70 FL (ref 78–100)
MONOCYTES # BLD AUTO: 0.5 10E3/UL (ref 0–1.3)
MONOCYTES NFR BLD AUTO: 5 %
NEUTROPHILS # BLD AUTO: 7.6 10E3/UL (ref 1.6–8.3)
NEUTROPHILS NFR BLD AUTO: 82 %
NRBC # BLD AUTO: 0 10E3/UL
NRBC BLD AUTO-RTO: 0 /100
PLATELET # BLD AUTO: 283 10E3/UL (ref 150–450)
POTASSIUM SERPL-SCNC: 3.7 MMOL/L (ref 3.4–5.3)
PROT SERPL-MCNC: 7.1 G/DL (ref 6.4–8.3)
RBC # BLD AUTO: 5.06 10E6/UL (ref 3.8–5.2)
SODIUM SERPL-SCNC: 135 MMOL/L (ref 135–145)
WBC # BLD AUTO: 9.3 10E3/UL (ref 4–11)

## 2025-02-09 PROCEDURE — 85004 AUTOMATED DIFF WBC COUNT: CPT | Performed by: EMERGENCY MEDICINE

## 2025-02-09 PROCEDURE — 96375 TX/PRO/DX INJ NEW DRUG ADDON: CPT | Performed by: EMERGENCY MEDICINE

## 2025-02-09 PROCEDURE — 250N000011 HC RX IP 250 OP 636: Mod: JZ | Performed by: EMERGENCY MEDICINE

## 2025-02-09 PROCEDURE — 96372 THER/PROPH/DIAG INJ SC/IM: CPT | Mod: XU | Performed by: EMERGENCY MEDICINE

## 2025-02-09 PROCEDURE — 85041 AUTOMATED RBC COUNT: CPT | Performed by: EMERGENCY MEDICINE

## 2025-02-09 PROCEDURE — 36415 COLL VENOUS BLD VENIPUNCTURE: CPT | Performed by: EMERGENCY MEDICINE

## 2025-02-09 PROCEDURE — 82040 ASSAY OF SERUM ALBUMIN: CPT | Performed by: EMERGENCY MEDICINE

## 2025-02-09 PROCEDURE — 62270 DX LMBR SPI PNXR: CPT | Performed by: NURSE ANESTHETIST, CERTIFIED REGISTERED

## 2025-02-09 PROCEDURE — 99291 CRITICAL CARE FIRST HOUR: CPT | Mod: 25 | Performed by: EMERGENCY MEDICINE

## 2025-02-09 RX ORDER — HYDROMORPHONE HYDROCHLORIDE 1 MG/ML
0.5 INJECTION, SOLUTION INTRAMUSCULAR; INTRAVENOUS; SUBCUTANEOUS
Status: COMPLETED | OUTPATIENT
Start: 2025-02-09 | End: 2025-02-09

## 2025-02-09 RX ORDER — SODIUM CHLORIDE 9 MG/ML
INJECTION, SOLUTION INTRAVENOUS CONTINUOUS
Status: DISCONTINUED | OUTPATIENT
Start: 2025-02-09 | End: 2025-02-10 | Stop reason: HOSPADM

## 2025-02-09 RX ORDER — KETOROLAC TROMETHAMINE 30 MG/ML
30 INJECTION, SOLUTION INTRAMUSCULAR; INTRAVENOUS ONCE
Status: COMPLETED | OUTPATIENT
Start: 2025-02-09 | End: 2025-02-09

## 2025-02-09 RX ORDER — HYDROMORPHONE HYDROCHLORIDE 1 MG/ML
0.5 INJECTION, SOLUTION INTRAMUSCULAR; INTRAVENOUS; SUBCUTANEOUS EVERY 30 MIN PRN
Status: DISCONTINUED | OUTPATIENT
Start: 2025-02-09 | End: 2025-02-09

## 2025-02-09 RX ORDER — KETOROLAC TROMETHAMINE 15 MG/ML
10 INJECTION, SOLUTION INTRAMUSCULAR; INTRAVENOUS ONCE
Status: DISCONTINUED | OUTPATIENT
Start: 2025-02-09 | End: 2025-02-09

## 2025-02-09 RX ADMIN — HYDROMORPHONE HYDROCHLORIDE 0.5 MG: 1 INJECTION, SOLUTION INTRAMUSCULAR; INTRAVENOUS; SUBCUTANEOUS at 23:32

## 2025-02-09 RX ADMIN — HYDROMORPHONE HYDROCHLORIDE 1 MG: 1 INJECTION, SOLUTION INTRAMUSCULAR; INTRAVENOUS; SUBCUTANEOUS at 23:03

## 2025-02-09 RX ADMIN — KETOROLAC TROMETHAMINE 30 MG: 30 INJECTION, SOLUTION INTRAMUSCULAR at 23:03

## 2025-02-09 ASSESSMENT — ENCOUNTER SYMPTOMS
WEAKNESS: 0
NUMBNESS: 0
PHOTOPHOBIA: 0
FEVER: 0
HEADACHES: 1
TREMORS: 0
RESPIRATORY NEGATIVE: 1
NECK PAIN: 1
GASTROINTESTINAL NEGATIVE: 1
CARDIOVASCULAR NEGATIVE: 1
NECK STIFFNESS: 1

## 2025-02-09 ASSESSMENT — ACTIVITIES OF DAILY LIVING (ADL)
ADLS_ACUITY_SCORE: 41
ADLS_ACUITY_SCORE: 43

## 2025-02-10 ENCOUNTER — ANESTHESIA EVENT (OUTPATIENT)
Dept: EMERGENCY MEDICINE | Facility: OTHER | Age: 35
End: 2025-02-10
Payer: MEDICAID

## 2025-02-10 ENCOUNTER — ANESTHESIA (OUTPATIENT)
Dept: EMERGENCY MEDICINE | Facility: OTHER | Age: 35
End: 2025-02-10
Payer: MEDICAID

## 2025-02-10 ENCOUNTER — APPOINTMENT (OUTPATIENT)
Dept: CT IMAGING | Facility: OTHER | Age: 35
End: 2025-02-10
Attending: FAMILY MEDICINE
Payer: MEDICAID

## 2025-02-10 VITALS
DIASTOLIC BLOOD PRESSURE: 69 MMHG | HEART RATE: 70 BPM | SYSTOLIC BLOOD PRESSURE: 116 MMHG | OXYGEN SATURATION: 98 % | RESPIRATION RATE: 18 BRPM | HEIGHT: 65 IN | WEIGHT: 272 LBS | TEMPERATURE: 96.7 F | BODY MASS INDEX: 45.32 KG/M2

## 2025-02-10 LAB
APPEARANCE CSF: CLEAR
C GATTII+NEOFOR DNA CSF QL NAA+NON-PROBE: NEGATIVE
CMV DNA CSF QL NAA+NON-PROBE: NEGATIVE
COLOR CSF: COLORLESS
E COLI K1 AG CSF QL: NEGATIVE
EV RNA SPEC QL NAA+PROBE: NEGATIVE
GLUCOSE CSF-MCNC: 43 MG/DL (ref 40–70)
GP B STREP DNA CSF QL NAA+NON-PROBE: NEGATIVE
HAEM INFLU DNA CSF QL NAA+NON-PROBE: NEGATIVE
HHV6 DNA CSF QL NAA+NON-PROBE: NEGATIVE
HOLD SPECIMEN: NORMAL
HSV1 DNA CSF QL NAA+NON-PROBE: NEGATIVE
HSV2 DNA CSF QL NAA+NON-PROBE: POSITIVE
L MONOCYTOG DNA CSF QL NAA+NON-PROBE: NEGATIVE
N MEN DNA CSF QL NAA+NON-PROBE: NEGATIVE
OTHER CELLS CSF: 1 %
OTHER CELLS CSF: 99 %
PARECHOVIRUS A RNA CSF QL NAA+NON-PROBE: NEGATIVE
PROT CSF-MCNC: 125.9 MG/DL (ref 15–45)
RBC # CSF MANUAL: 8 /UL (ref 0–2)
S PNEUM DNA CSF QL NAA+NON-PROBE: NEGATIVE
TUBE # CSF: 4
VZV DNA CSF QL NAA+NON-PROBE: NEGATIVE
WBC # CSF MANUAL: 587 /UL (ref 0–5)

## 2025-02-10 PROCEDURE — 96367 TX/PROPH/DG ADDL SEQ IV INF: CPT | Mod: XU | Performed by: EMERGENCY MEDICINE

## 2025-02-10 PROCEDURE — 36410 VNPNXR 3YR/> PHY/QHP DX/THER: CPT | Performed by: NURSE ANESTHETIST, CERTIFIED REGISTERED

## 2025-02-10 PROCEDURE — 96366 THER/PROPH/DIAG IV INF ADDON: CPT | Mod: XS | Performed by: EMERGENCY MEDICINE

## 2025-02-10 PROCEDURE — 258N000003 HC RX IP 258 OP 636: Performed by: EMERGENCY MEDICINE

## 2025-02-10 PROCEDURE — 96376 TX/PRO/DX INJ SAME DRUG ADON: CPT | Mod: XU | Performed by: EMERGENCY MEDICINE

## 2025-02-10 PROCEDURE — 87483 CNS DNA AMP PROBE TYPE 12-25: CPT | Performed by: EMERGENCY MEDICINE

## 2025-02-10 PROCEDURE — 87205 SMEAR GRAM STAIN: CPT | Performed by: EMERGENCY MEDICINE

## 2025-02-10 PROCEDURE — 96365 THER/PROPH/DIAG IV INF INIT: CPT | Mod: XS | Performed by: EMERGENCY MEDICINE

## 2025-02-10 PROCEDURE — 62270 DX LMBR SPI PNXR: CPT | Performed by: EMERGENCY MEDICINE

## 2025-02-10 PROCEDURE — 87529 HSV DNA AMP PROBE: CPT | Performed by: EMERGENCY MEDICINE

## 2025-02-10 PROCEDURE — 250N000011 HC RX IP 250 OP 636: Mod: JZ | Performed by: EMERGENCY MEDICINE

## 2025-02-10 PROCEDURE — 250N000011 HC RX IP 250 OP 636: Mod: JZ | Performed by: FAMILY MEDICINE

## 2025-02-10 PROCEDURE — 84157 ASSAY OF PROTEIN OTHER: CPT | Performed by: EMERGENCY MEDICINE

## 2025-02-10 PROCEDURE — 96375 TX/PRO/DX INJ NEW DRUG ADDON: CPT | Mod: XU | Performed by: EMERGENCY MEDICINE

## 2025-02-10 PROCEDURE — 250N000013 HC RX MED GY IP 250 OP 250 PS 637: Performed by: EMERGENCY MEDICINE

## 2025-02-10 PROCEDURE — 70450 CT HEAD/BRAIN W/O DYE: CPT

## 2025-02-10 PROCEDURE — 89051 BODY FLUID CELL COUNT: CPT | Performed by: EMERGENCY MEDICINE

## 2025-02-10 PROCEDURE — 82945 GLUCOSE OTHER FLUID: CPT | Performed by: EMERGENCY MEDICINE

## 2025-02-10 PROCEDURE — 258N000003 HC RX IP 258 OP 636: Performed by: FAMILY MEDICINE

## 2025-02-10 RX ORDER — DEXAMETHASONE SODIUM PHOSPHATE 10 MG/ML
10 INJECTION, SOLUTION INTRAMUSCULAR; INTRAVENOUS EVERY 6 HOURS
Status: DISCONTINUED | OUTPATIENT
Start: 2025-02-10 | End: 2025-02-10 | Stop reason: HOSPADM

## 2025-02-10 RX ORDER — OXYCODONE HYDROCHLORIDE 5 MG/1
10 TABLET ORAL ONCE
Status: COMPLETED | OUTPATIENT
Start: 2025-02-10 | End: 2025-02-10

## 2025-02-10 RX ORDER — CEFTRIAXONE 2 G/1
2 INJECTION, POWDER, FOR SOLUTION INTRAMUSCULAR; INTRAVENOUS EVERY 12 HOURS
Status: DISCONTINUED | OUTPATIENT
Start: 2025-02-10 | End: 2025-02-10 | Stop reason: HOSPADM

## 2025-02-10 RX ORDER — CEFTRIAXONE 2 G/1
2 INJECTION, POWDER, FOR SOLUTION INTRAMUSCULAR; INTRAVENOUS EVERY 24 HOURS
Status: DISCONTINUED | OUTPATIENT
Start: 2025-02-11 | End: 2025-02-10

## 2025-02-10 RX ORDER — VANCOMYCIN HYDROCHLORIDE
1500 EVERY 8 HOURS
Status: DISCONTINUED | OUTPATIENT
Start: 2025-02-10 | End: 2025-02-10

## 2025-02-10 RX ORDER — ONDANSETRON 2 MG/ML
4 INJECTION INTRAMUSCULAR; INTRAVENOUS ONCE
Status: COMPLETED | OUTPATIENT
Start: 2025-02-10 | End: 2025-02-10

## 2025-02-10 RX ORDER — HYDROMORPHONE HYDROCHLORIDE 1 MG/ML
0.5 INJECTION, SOLUTION INTRAMUSCULAR; INTRAVENOUS; SUBCUTANEOUS
Status: COMPLETED | OUTPATIENT
Start: 2025-02-10 | End: 2025-02-10

## 2025-02-10 RX ORDER — VANCOMYCIN HYDROCHLORIDE
1500 EVERY 8 HOURS
Status: DISCONTINUED | OUTPATIENT
Start: 2025-02-10 | End: 2025-02-10 | Stop reason: HOSPADM

## 2025-02-10 RX ORDER — MULTIVITAMIN WITH IRON
500 TABLET ORAL DAILY
COMMUNITY

## 2025-02-10 RX ORDER — RED ALGAE
1 POWDER (GRAM) MISCELLANEOUS DAILY
COMMUNITY

## 2025-02-10 RX ORDER — VANCOMYCIN HYDROCHLORIDE
1250 EVERY 12 HOURS
Status: DISCONTINUED | OUTPATIENT
Start: 2025-02-10 | End: 2025-02-10

## 2025-02-10 RX ORDER — HYDROMORPHONE HYDROCHLORIDE 1 MG/ML
0.5 INJECTION, SOLUTION INTRAMUSCULAR; INTRAVENOUS; SUBCUTANEOUS EVERY 30 MIN PRN
Status: COMPLETED | OUTPATIENT
Start: 2025-02-10 | End: 2025-02-10

## 2025-02-10 RX ORDER — CEFTRIAXONE 2 G/1
2 INJECTION, POWDER, FOR SOLUTION INTRAMUSCULAR; INTRAVENOUS ONCE
Status: COMPLETED | OUTPATIENT
Start: 2025-02-10 | End: 2025-02-10

## 2025-02-10 RX ORDER — HYDROMORPHONE HYDROCHLORIDE 1 MG/ML
0.5 INJECTION, SOLUTION INTRAMUSCULAR; INTRAVENOUS; SUBCUTANEOUS ONCE
Status: COMPLETED | OUTPATIENT
Start: 2025-02-10 | End: 2025-02-10

## 2025-02-10 RX ADMIN — HYDROMORPHONE HYDROCHLORIDE 0.5 MG: 1 INJECTION, SOLUTION INTRAMUSCULAR; INTRAVENOUS; SUBCUTANEOUS at 00:58

## 2025-02-10 RX ADMIN — SODIUM CHLORIDE: 0.9 INJECTION, SOLUTION INTRAVENOUS at 00:58

## 2025-02-10 RX ADMIN — ACYCLOVIR SODIUM 840 MG: 50 INJECTION, SOLUTION INTRAVENOUS at 01:58

## 2025-02-10 RX ADMIN — ONDANSETRON 4 MG: 2 INJECTION INTRAMUSCULAR; INTRAVENOUS at 04:12

## 2025-02-10 RX ADMIN — ONDANSETRON 4 MG: 2 INJECTION INTRAMUSCULAR; INTRAVENOUS at 10:30

## 2025-02-10 RX ADMIN — ACYCLOVIR SODIUM 840 MG: 50 INJECTION, SOLUTION INTRAVENOUS at 10:52

## 2025-02-10 RX ADMIN — OXYCODONE HYDROCHLORIDE 10 MG: 5 TABLET ORAL at 03:55

## 2025-02-10 RX ADMIN — Medication 1500 MG: at 09:32

## 2025-02-10 RX ADMIN — CEFTRIAXONE 2 G: 2 INJECTION, POWDER, FOR SOLUTION INTRAMUSCULAR; INTRAVENOUS at 01:24

## 2025-02-10 RX ADMIN — HYDROMORPHONE HYDROCHLORIDE 0.5 MG: 1 INJECTION, SOLUTION INTRAMUSCULAR; INTRAVENOUS; SUBCUTANEOUS at 07:39

## 2025-02-10 RX ADMIN — ONDANSETRON 4 MG: 2 INJECTION INTRAMUSCULAR; INTRAVENOUS at 07:49

## 2025-02-10 RX ADMIN — Medication 1500 MG: at 03:02

## 2025-02-10 RX ADMIN — HYDROMORPHONE HYDROCHLORIDE 0.5 MG: 1 INJECTION, SOLUTION INTRAMUSCULAR; INTRAVENOUS; SUBCUTANEOUS at 10:31

## 2025-02-10 RX ADMIN — HYDROMORPHONE HYDROCHLORIDE 0.5 MG: 1 INJECTION, SOLUTION INTRAMUSCULAR; INTRAVENOUS; SUBCUTANEOUS at 14:57

## 2025-02-10 RX ADMIN — HYDROMORPHONE HYDROCHLORIDE 0.5 MG: 1 INJECTION, SOLUTION INTRAMUSCULAR; INTRAVENOUS; SUBCUTANEOUS at 16:22

## 2025-02-10 RX ADMIN — CEFTRIAXONE 2 G: 2 INJECTION, POWDER, FOR SOLUTION INTRAMUSCULAR; INTRAVENOUS at 14:03

## 2025-02-10 RX ADMIN — HYDROMORPHONE HYDROCHLORIDE 0.5 MG: 1 INJECTION, SOLUTION INTRAMUSCULAR; INTRAVENOUS; SUBCUTANEOUS at 02:07

## 2025-02-10 RX ADMIN — DEXAMETHASONE SODIUM PHOSPHATE 10 MG: 10 INJECTION, SOLUTION INTRAMUSCULAR; INTRAVENOUS at 13:40

## 2025-02-10 ASSESSMENT — ACTIVITIES OF DAILY LIVING (ADL)
ADLS_ACUITY_SCORE: 43

## 2025-02-10 NOTE — ANESTHESIA PROCEDURE NOTES
"Lumbar puncture Procedure Note    Pre-Procedure   Staff -        CRNA: Cassy Duke APRN CRNA       Performed By: CRNA       Location: ED       Procedure Start/Stop Times: 2/9/2025 11:45 PM and 2/10/2025 12:15 AM       Pre-Anesthestic Checklist: patient identified, IV checked, risks and benefits discussed, informed consent, monitors and equipment checked, pre-op evaluation and at physician/surgeon's request  Timeout:       Correct Patient: Yes        Correct Procedure: Yes        Correct Site: Yes        Correct Position: Yes   Procedure Documentation  Procedure: lumbar puncture         Diagnosis: headache, stiff neck, history of meningitis       Patient Position: sitting       Patient Prep/Sterile Barriers: sterile gloves, mask, patient draped       Skin prep: Chloraprep       Insertion Site: L3-4. (midline approach).       Needle Gauge: 25.        Needle Length (Inches): 5        Spinal Needle Type: PencanNo introducer used       # of attempts: 3 and  # of redirects:  3    Assessment/Narrative         Paresthesias: No.       CSF fluid: clear.    Medication(s) Administered   Medication Administration Time: 2/9/2025 11:45 PM      FOR [facility] ([facility] ONLY:   Pain Team Contact information: please page the Pain Team Via [employer]. Search \"Pain\". During daytime hours, please page the attending first. At night please page the resident first.      "

## 2025-02-10 NOTE — PHARMACY-VANCOMYCIN DOSING SERVICE
Pharmacy Vancomycin Note  Date of Service February 10, 2025  Patient's  1990   34 year old, female    Indication: Meningitis  Day of Therapy: 1  Current vancomycin regimen:  1500 mg IV q8h  Current vancomycin monitoring method: AUC  Current vancomycin therapeutic monitoring goal: 400-600 mg*h/L      Current estimated CrCl = Estimated Creatinine Clearance: 124.5 mL/min (based on SCr of 0.84 mg/dL).    Creatinine for last 3 days  2025:  8:54 AM Creatinine 0.78 mg/dL  2025: 11:05 PM Creatinine 0.84 mg/dL    Recent Vancomycin Levels (past 3 days)  No results found for requested labs within last 3 days.    Vancomycin IV Administrations (past 72 hours)                     vancomycin (VANCOCIN) 1,500 mg in 0.9% NaCl 250 mL intermittent infusion (mg) 1,500 mg New Bag 02/10/25 0302                    Nephrotoxins and other renal medications (From now, onward)      Start     Dose/Rate Route Frequency Ordered Stop    02/10/25 1000  vancomycin (VANCOCIN) 1,250 mg in 0.9% NaCl 250 mL intermittent infusion         1,250 mg  166.7 mL/hr over 90 Minutes Intravenous EVERY 12 HOURS 02/10/25 0724      02/10/25 0200  acyclovir (ZOVIRAX) 840 mg in sodium chloride 0.9 % 266.8 mL intermittent infusion         840 mg  266.8 mL/hr over 1 Hours Intravenous EVERY 8 HOURS 02/10/25 0109                 Contrast Orders - past 72 hours (72h ago, onward)      None            Interpretation of levels and current regimen:  Vancomycin dose matches meningitis dosing policy        Has serum creatinine changed greater than 50% in last 72 hours: No    Urine output:  good urine output    Renal Function: Stable    Insight[employer] Prediction of Planned New Vancomycin Regimen  Regimen: 1500 mg IV every 8 hours.  Start time: 10:00 on 02/10/2025  Exposure target: AUC24 (range)400-600 mg/L.hr   AUC24,ss: 972 mg/L.hr  Probability of AUC24 > 400: 95 %  Ctrough,ss: 30.7 mg/L  Probability of Ctrough,ss > 20: 70 %  Probability of nephrotoxicity (Lodise MICHAELLE  2009): 44 %      Plan:  Continue Current Dose  Vancomycin monitoring method: AUC  Vancomycin therapeutic monitoring goal: 15-20 mg/L  Pharmacy will check vancomycin levels as appropriate in 1-3 Days.  Serum creatinine levels will be ordered daily for the first week of therapy and at least twice weekly for subsequent weeks.    Alex Michelle RP

## 2025-02-10 NOTE — PHARMACY-ADMISSION MEDICATION HISTORY
Pharmacist Admission Medication History    Admission medication history is complete. The information provided in this note is only as accurate as the sources available at the time of the update.    Information Source(s): Patient and CareEverywhere/SureScripts via in-person    Pertinent Information: No pertinent information     Changes made to PTA medication list:  Added:   red algae  Deleted:   Valacyclovir  Changed:   Vitamin B12    Allergies reviewed with patient and updates made in EHR: yes    Medication History Completed By: Alex Michelle RPH 2/10/2025 11:42 AM    PTA Med List   Medication Sig Last Dose/Taking    [place] Red Algae POWD Take 1 capsule by mouth daily. Past Week    calcium carbonate-vitamin D (CALTRATE) 600-10 MG-MCG per tablet Take 1 tablet by mouth. Past Week    cyanocobalamin (VITAMIN B-12) 500 MCG tablet Take 500 mcg by mouth daily. Past Week    Lactobacillus (ACIDOPHILUS) tablet Take 1 tablet by mouth daily. Past Week    sertraline (ZOLOFT) 100 MG tablet Take 1 tablet (100 mg) by mouth daily. 2/9/2025 Morning

## 2025-02-10 NOTE — ED NOTES
[facility] called back and stated they will most likely not be able to accept pt today. They advised looking at other facilities.

## 2025-02-10 NOTE — PHARMACY-VANCOMYCIN DOSING SERVICE
Pharmacy Vancomycin Initial Note  Date of Service February 10, 2025  Patient's  1990  34 year old, female    Indication: Meningitis    Current estimated CrCl = Estimated Creatinine Clearance: 124.5 mL/min (based on SCr of 0.84 mg/dL).    Creatinine for last 3 days  2025:  8:54 AM Creatinine 0.78 mg/dL  2025: 11:05 PM Creatinine 0.84 mg/dL    Recent Vancomycin Level(s) for last 3 days  No results found for requested labs within last 3 days.      Vancomycin IV Administrations (past 72 hours)        No vancomycin orders with administrations in past 72 hours.                    Nephrotoxins and other renal medications (From now, onward)      Start     Dose/Rate Route Frequency Ordered Stop    02/10/25 0200  acyclovir (ZOVIRAX) 840 mg in sodium chloride 0.9 % 266.8 mL intermittent infusion         840 mg  266.8 mL/hr over 1 Hours Intravenous EVERY 8 HOURS 02/10/25 0109      02/10/25 0200  vancomycin (VANCOCIN) 1,500 mg in 0.9% NaCl 250 mL intermittent infusion         1,500 mg  166.7 mL/hr over 90 Minutes Intravenous EVERY 8 HOURS 02/10/25 0117              Contrast Orders - past 72 hours (72h ago, onward)      None          AUC monitoring not indicated for CNS infections.          Plan:  Start vancomycin  1500 mg IV q8h.   Vancomycin monitoring method: Trough (Method 1 = dosing nomogram)  Vancomycin therapeutic monitoring goal: 15-20 mg/L  Pharmacy will check vancomycin levels as appropriate in 1-3 Days.    Serum creatinine levels will be ordered daily for the first week of therapy and at least twice weekly for subsequent weeks.      Fco Welsh [facility]

## 2025-02-10 NOTE — ED NOTES
[facility] called with acceptance. Room number is 1724 on medical unit. Nurse to nurse number is [phone].     [facility] called [facility] and requested transport, unable to until after 1800, writer requested look into [facility] and whoever is able to transfer the pt first.    Antonio will call back with an update.

## 2025-02-10 NOTE — ED NOTES
Call placed to [facility] transfer line, pt remains active on their waitlist at this time.

## 2025-02-10 NOTE — ANESTHESIA PREPROCEDURE EVALUATION
Anesthesia Pre-Procedure Evaluation    Patient: Gabbie Tavares   MRN: [id number] : 1990        Procedure : lumbar puncture          Past Medical History:   Diagnosis Date    No pertinent past medical history       Past Surgical History:   Procedure Laterality Date    GALLBLADDER SURGERY      [facility]        Allergies   Allergen Reactions    Bee Venom Anaphylaxis    Droperidol Anxiety    Acetaminophen Nausea and Vomiting     Other reaction(s): Gastrointestinal  Mild per pt.    Lanolin     Latex Other (See Comments)     LATEX PRECAUTIONS due to hx of seasonal allergies and frequent exposure to latex    Penicillins Rash     PN: LW Reaction: rash/facial swelling      Social History     Tobacco Use    Smoking status: Former     Current packs/day: 0.50     Types: Cigarettes    Smokeless tobacco: Never   Substance Use Topics    Alcohol use: Not Currently      Wt Readings from Last 1 Encounters:   25 123.4 kg (272 lb)        Anesthesia Evaluation   Pt has had prior anesthetic.         ROS/MED HX  ENT/Pulmonary:       Neurologic:       Cardiovascular:       METS/Exercise Tolerance:     Hematologic:       Musculoskeletal:       GI/Hepatic:       Renal/Genitourinary:       Endo:     (+)               Obesity,       Psychiatric/Substance Use:       Infectious Disease: Comment: Possible meningitis.  She has had approximately 10 lumbar punctures before  Severe headache and stiff neck complaints  Platelets wnl  Pt denies any anticoagulants use      Malignancy:       Other:               OUTSIDE LABS:  CBC:   Lab Results   Component Value Date    WBC 9.3 2025    WBC 6.9 2025    HGB 11.0 (L) 2025    HGB 11.3 (L) 2025    HCT 35.6 2025    HCT 36.7 2025     2025     2025     BMP:   Lab Results   Component Value Date     2025     2025    POTASSIUM 3.7 2025    POTASSIUM 4.1 2025    CHLORIDE 100 2025  "   CHLORIDE 104 02/07/2025    CO2 23 02/09/2025    CO2 24 02/07/2025    BUN 9.9 02/09/2025    BUN 11.2 02/07/2025    CR 0.84 02/09/2025    CR 0.78 02/07/2025    GLC 89 02/09/2025    GLC 86 02/07/2025     COAGS:   Lab Results   Component Value Date    INR 0.94 10/20/2020     POC:   Lab Results   Component Value Date    HCG Negative 05/10/2021    HCGS Negative 11/13/2024     HEPATIC:   Lab Results   Component Value Date    ALBUMIN 3.9 02/09/2025    PROTTOTAL 7.1 02/09/2025    ALT 13 02/09/2025    AST 16 02/09/2025    ALKPHOS 77 02/09/2025    BILITOTAL 0.2 02/09/2025     OTHER:   Lab Results   Component Value Date    GARRICK 9.0 02/09/2025    LIPASE 19 11/13/2024    CRP 21.2 (H) 10/16/2020       Anesthesia Plan       - Procedure: Procedure only, no anesthetic delivered                    Consents    Anesthesia Plan(s) and associated risks, benefits, and realistic alternatives discussed. Questions answered and patient/representative(s) expressed understanding.     - Discussed: Risks, Benefits and Alternatives for the PROCEDURE were discussed     - Discussed with:  Patient            Postoperative Care            Comments:               SINCERE HERNANDEZ CRNA    I have reviewed the pertinent notes and labs in the chart from the past 30 days and (re)examined the patient.  Any updates or changes from those notes are reflected in this note.    Clinically Significant Risk Factors Present on Admission                   # Hypertension: Noted on problem list           # Severe Obesity: Estimated body mass index is 45.26 kg/m  as calculated from the following:    Height as of 2/7/25: 1.651 m (5' 5\").    Weight as of this encounter: 123.4 kg (272 lb).                "

## 2025-02-10 NOTE — ED PROVIDER NOTES
[facility]  Emergency Department Sign Out Note      Transfer of care from Dr. Pedro. See separate Emergency Department note.    Assessment and Plan:  The patient was evaluated by the previous provider for meningitis.  She was accepted to [facility] for transfer to a higher level of care.  She has recurrent HSV-2 meningitis.     ED Course as of 02/10/25 1643   Mon Feb 10, 2025   1113 . [facility] likely cannot take the patient today.  Calling [facility].  Will get a call back.   1245 Patient was declined by our hospitalist Dr. Foley, due to needing ID and Neurology consults inpatient which we do not have here.  Discussing with [facility].  Declined by [facility] for the same reasons as above.   1250 Accepted to [facility], but wait listed.   1640 CT is negative for brain abnormalities   1641 HSV-2 PCR is now positive for her current meningitis.       Diagnosis  1. Nonintractable episodic headache, unspecified headache type    2. Meningitis        Disposition:  Transfer to [place]    MD Patricia Kwon Melissa, MD  02/10/25 1643

## 2025-02-10 NOTE — ED TRIAGE NOTES
Pt presents to triage for concern of ongoing headache, neck and back pain, leg pain. Pt was seen here recently for same, denies any new injury or falls but has been dealing with increasing symptoms since that time. Pt has had worsening mobility in her neck as well. Reports hx of meningitis 3 years ago, HSV. Using ibuprofen without relief, last dose at 2030. Pain 7 at rest, up to 10 with any activity.     /77   Pulse 79   Temp 97.4  F (36.3  C) (Tympanic)   Resp 18   Wt 123.4 kg (272 lb)   SpO2 99%   BMI 45.26 kg/m           Triage Assessment (Adult)       Row Name 02/09/25 5178          Triage Assessment    Airway WDL WDL        Respiratory WDL    Respiratory WDL WDL        Skin Circulation/Temperature WDL    Skin Circulation/Temperature WDL WDL        Cardiac WDL    Cardiac WDL WDL        Peripheral/Neurovascular WDL    Peripheral Neurovascular WDL WDL        Cognitive/Neuro/Behavioral WDL    Cognitive/Neuro/Behavioral WDL X  headache, neck stiffness.

## 2025-02-10 NOTE — ED PROVIDER NOTES
History     Chief Complaint   Patient presents with    Headache    Neck Pain    Back Pain     HPI  Gabbie Tavares is a 34 year old female who presents with recurrent worsening headache she was here 2 days ago and was treated with medication.  She has a complicated history including previous episode of HSV meningitis for which she was hospitalized in [place] and discharged with a PICC line since that time she has been evaluated multiple times for headache and most are treated conservatively as clinical suspicion for meningitis was low.  1 time recently she did get an LP was negative.  She comes in now saying that today is different and that she has severe neck pain exacerbated by any kind of movement.  No fevers or rash no nausea or vomiting.  She understands that she would need a lumbar puncture in order to make a diagnosis and she is reluctantly willing.  She did say that it is difficult as she has a lot of scar tissue because when she did have the viral meningitis she underwent numerous lumbar punctures.  She said the headache is worse when she lays flat.    Allergies:  Allergies   Allergen Reactions    Bee Venom Anaphylaxis    Droperidol Anxiety    Acetaminophen Nausea and Vomiting     Other reaction(s): Gastrointestinal  Mild per pt.    Lanolin     Latex Other (See Comments)     LATEX PRECAUTIONS due to hx of seasonal allergies and frequent exposure to latex    Penicillins Rash     PN: LW Reaction: rash/facial swelling       Problem List:    Patient Active Problem List    Diagnosis Date Noted    Iron deficiency 10/04/2024     Priority: Medium    History of nicotine use 11/04/2022     Priority: Medium     As of 8.2022 1/2 PPD  Quit oct 2022      Generalized anxiety disorder with panic attacks 09/23/2022     Priority: Medium     Sertraline started sept 2022. Positive response. Sleeping better and feeling much better by Nov 2022. 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 Lumbar puncture done at the time of admission showed 513 white blood cells, with 95% lymphocytes. Meningitis PCR panel showed HSV-2. She was treated initially with ceftriaxone but this was discontinued as her CSF culture was negative as well as her meningitis panel for bacterial infection. The patient was treated initially with IV acyclovir, after her headache and neck pain resolved she was transitioned to oral acyclovir to complete a 14-day course. She was discharged home and had known headache or neck pain at the time of discharge.  Herpes simplex 2 genital infection  The patient had an STD evaluation this hospitalization which showed HSV-2, her HIV test was negative, chlamydia, gonorrhea, trichomonas were all negative. Hepatitis screening panel was also negative.  Procedures:  CSF puncture      HSV (herpes simplex virus) infection 03/30/2022     Priority: Medium    Type 2 diabetes mellitus with diabetic neuropathic arthropathy, without long-term current use of insulin (H) 03/30/2022     Priority: Medium    Cellulitis of submental space 02/21/2022     Priority: Medium    Cervical high risk HPV (human papillomavirus) test positive 01/04/2022     Priority: Medium     5.10.21 wnl HPVother+  11.4.22 wnl HPVother+  01/10/23 Colpo:  benitez 1  2/2024. Wnl. Hpv negative. Rpt 3 years      Morbid obesity (H) 05/10/2021     Priority: Medium    Migraine headache 03/21/2018     Priority: Medium    History of thalassemia minor 12/29/2017     Priority: Medium     Sent for records to confirm      Preeclampsia 07/24/2015     Priority: Medium    Headache 02/16/2015     Priority: Medium    Nonallopathic lesion of thoracic region 02/16/2015     Priority: Medium    Cholecystitis 05/30/2014     Priority:  Medium    Temporomandibular joint disorder 10/15/2007     Priority: Medium     IMO Update 10/11      Iron deficiency anemia 01/20/2006     Priority: Medium     Iron deficiency.  Check hemoglobins with well child examinations.  Iron deficiency.  Check hemoglobins with well child examinations.          Past Medical History:    Past Medical History:   Diagnosis Date    No pertinent past medical history        Past Surgical History:    Past Surgical History:   Procedure Laterality Date    GALLBLADDER SURGERY  2014    Formerly [facility]  2005       Family History:    Family History   Problem Relation Age of Onset    No Known Problems Mother     No Known Problems Father     No Known Problems Sister     No Known Problems Brother     No Known Problems Brother     No Known Problems Brother     No Known Problems Brother     No Known Problems Brother     No Known Problems Brother     No Known Problems Sister     No Known Problems Sister     No Known Problems Son     No Known Problems Daughter        Social History:  Marital Status:  Single [1]  Social History     Tobacco Use    Smoking status: Former     Current packs/day: 0.50     Types: Cigarettes    Smokeless tobacco: Never   Substance Use Topics    Alcohol use: Not Currently    Drug use: No        Medications:    calcium carbonate-vitamin D (CALTRATE) 600-10 MG-MCG per tablet  Cyanocobalamin 50 MCG TABS  hydrOXYzine HCl (ATARAX) 25 MG tablet  Lactobacillus (ACIDOPHILUS) tablet  sertraline (ZOLOFT) 100 MG tablet  valACYclovir (VALTREX) 500 MG tablet          Review of Systems   Constitutional:  Negative for fever.   Eyes:  Negative for photophobia.   Respiratory: Negative.     Cardiovascular: Negative.    Gastrointestinal: Negative.    Musculoskeletal:  Positive for neck pain and neck stiffness.   Skin:  Negative for rash.   Neurological:  Positive for headaches. Negative for tremors, weakness and numbness.   All other systems reviewed and are negative.      Physical Exam    BP: 137/77  Pulse: 79  Temp: 97.4  F (36.3  C)  Resp: 18  Weight: 123.4 kg (272 lb)  SpO2: 99 %      Physical Exam  Vitals and nursing note reviewed.   Constitutional:       General: She is in acute distress.   HENT:      Head: Atraumatic.      Mouth/Throat:      Mouth: Mucous membranes are moist.   Eyes:      Extraocular Movements: Extraocular movements intact.      Pupils: Pupils are equal, round, and reactive to light.      Comments: No photophobia, no pain with EOM   Cardiovascular:      Rate and Rhythm: Normal rate and regular rhythm.   Pulmonary:      Effort: Pulmonary effort is normal.      Breath sounds: Normal breath sounds.   Abdominal:      General: Abdomen is protuberant.   Musculoskeletal:      Cervical back: Rigidity present.   Skin:     General: Skin is warm.      Findings: No rash.   Neurological:      General: No focal deficit present.      Mental Status: She is alert and oriented to person, place, and time.      Cranial Nerves: No cranial nerve deficit.   Psychiatric:         Mood and Affect: Mood normal.         Behavior: Behavior normal.       ED Course        Procedures      Multiple attempts we were unable to get a peripheral IV and then changed my pain medication orders to IM.  I attempted lumbar puncture and was unsuccessful.  At this point we contacted the nurse anesthetist on-call for both peripheral IV and for the LP.  LP is complicated by body habitus and scar tissue.  She may be a candidate for fluoroscopy.  She does not appear sick if she does have meningitis is almost certainly a viral process.       Was signed for the procedure.  Fluid was obtained by the nurse anesthetist.    Critical Care time:  was 60 minutes for this patient excluding procedures.  Medications   sodium chloride 0.9 % infusion ( Intravenous $New Bag 2/10/25 0058)   cefTRIAXone (ROCEPHIN) 2 g vial to attach to  ml bag for ADULTS or NS 50 ml bag for PEDS (2 g Intravenous $New Bag 2/10/25 8714)   acyclovir  (ZOVIRAX) 840 mg in sodium chloride 0.9 % 266.8 mL intermittent infusion (has no administration in time range)   vancomycin (VANCOCIN) 1,500 mg in 0.9% NaCl 250 mL intermittent infusion (has no administration in time range)   ketorolac (TORADOL) injection 30 mg (30 mg Intramuscular $Given 2/9/25 2303)   HYDROmorphone (DILAUDID) injection 1 mg (1 mg Intramuscular $Given 2/9/25 2303)   HYDROmorphone (PF) (DILAUDID) injection 0.5 mg (0.5 mg Intravenous $Given 2/9/25 2332)   HYDROmorphone (PF) (DILAUDID) injection 0.5 mg (0.5 mg Intravenous $Given 2/10/25 0058)              Results for orders placed or performed during the hospital encounter of 02/09/25 (from the past 24 hours)   CBC with platelets differential    Narrative    The following orders were created for panel order CBC with platelets differential.  Procedure                               Abnormality         Status                     ---------                               -----------         ------                     CBC with platelets and d...[[id number]]                                                   Please view results for these tests on the individual orders.   Extra Tube    Narrative    The following orders were created for panel order Extra Tube.  Procedure                               Abnormality         Status                     ---------                               -----------         ------                     Extra Blue Top Tube[[id number]]                                                         Extra Red Top Tube[[id number]]                                                          Extra Green Top (Lithium...[[id number]]                                                   Please view results for these tests on the individual orders.   Extra Tube    Narrative    The following orders were created for panel order Extra Tube.  Procedure                               Abnormality         Status                     ---------                                -----------         ------                     Extra Blood Culture Bottle[[id number]]                                                    Please view results for these tests on the individual orders.           Assessments & Plan (with Medical Decision Making)   34-year-old female with progressing headache and stiff neck reminiscent of previous episodes of HSV meningitis most recently about 2 years ago.  Today her lumbar puncture shows 587 nucleated cells.  Antigen screen is pending.  She was started on ceftriaxone 2 g vancomycin and acyclovir 10 mg/kg ideal body weight.  She is neurologically intact she did require a number of doses of IV pain medication to treat the headache.  She has no altered mental status no rash.  Given the duration of her symptoms is almost certainly viral and then likely HSV.  Nevertheless we will treat all possibilities until PCR test return.  She is excepted to [facility] there is currently no bed available she will stay here until morning at which point we will transfer.  Will continue her medication management.  I have reviewed the nursing notes.    I have reviewed the findings, diagnosis, plan and need for follow up with the patient.          Medical Decision Making  The patient's presentation was of high complexity (an acute health issue posing potential threat to life or bodily function).    The patient's evaluation involved:  ordering and/or review of 3+ test(s) in this encounter (CBC, metabolic panel, lumbar puncture PCR and cultures)    The patient's management necessitated high risk (a decision regarding hospitalization).  The LP results are consistent with the patient's clinical presentation and her past history.  She was started on ceftriaxone and acyclovir and vancomycin.  She is stable and will continue managing her headache and neck pain and continue the antibiotics and antiviral until her PCR results return.  Patient understands the plan.  Patient signed out to   Patricia pending transfer.  If she is still here at 10:00 she will need the next dose of vancomycin and acyclovir      New Prescriptions    No medications on file       Final diagnoses:   Nonintractable episodic headache, unspecified headache type       2/9/2025   [facility]       Robert Pedro MD  02/10/25 0135       Robert Pedro MD  02/10/25 [id number]

## 2025-02-10 NOTE — ED NOTES
Writer called [facility] transfer line. Pt is only one on their que, should be able to take her today after some discharges. They will call with any updates.

## 2025-02-11 LAB
HSV1 DNA CSF QL NAA+PROBE: NOT DETECTED
HSV2 DNA CSF QL NAA+PROBE: DETECTED

## 2025-02-11 NOTE — ED NOTES
[facility] called to relay critical results.  N2N provided for [facility].

## 2025-02-13 DIAGNOSIS — G03.9 MENINGITIS: Primary | ICD-10-CM

## 2025-02-13 LAB
BACTERIA CSF CULT: NORMAL
GRAM STAIN RESULT: NORMAL
GRAM STAIN RESULT: NORMAL

## 2025-02-15 LAB
BACTERIA CSF CULT: NO GROWTH
GRAM STAIN RESULT: NORMAL
GRAM STAIN RESULT: NORMAL

## 2025-02-17 ENCOUNTER — OFFICE VISIT (OUTPATIENT)
Dept: FAMILY MEDICINE | Facility: OTHER | Age: 35
End: 2025-02-17
Payer: MEDICAID

## 2025-02-17 ENCOUNTER — HOSPITAL ENCOUNTER (OUTPATIENT)
Dept: INFUSION THERAPY | Facility: OTHER | Age: 35
Discharge: HOME | End: 2025-02-17
Payer: MEDICAID

## 2025-02-17 VITALS
BODY MASS INDEX: 50.55 KG/M2 | RESPIRATION RATE: 20 BRPM | HEART RATE: 76 BPM | SYSTOLIC BLOOD PRESSURE: 116 MMHG | OXYGEN SATURATION: 98 % | TEMPERATURE: 97.9 F | DIASTOLIC BLOOD PRESSURE: 80 MMHG | WEIGHT: 293 LBS

## 2025-02-17 DIAGNOSIS — G03.9 MENINGITIS: Primary | ICD-10-CM

## 2025-02-17 DIAGNOSIS — Z09 HOSPITAL DISCHARGE FOLLOW-UP: Primary | ICD-10-CM

## 2025-02-17 DIAGNOSIS — B00.3 MENINGITIS DUE TO HERPES SIMPLEX VIRUS TYPE 2 (HSV-2): ICD-10-CM

## 2025-02-17 LAB
ALBUMIN SERPL BCG-MCNC: 3.7 G/DL (ref 3.5–5.2)
ALP SERPL-CCNC: 76 U/L (ref 40–150)
ALT SERPL W P-5'-P-CCNC: 15 U/L (ref 0–50)
ANION GAP SERPL CALCULATED.3IONS-SCNC: 7 MMOL/L (ref 7–15)
AST SERPL W P-5'-P-CCNC: 17 U/L (ref 0–45)
BASOPHILS # BLD AUTO: 0 10E3/UL (ref 0–0.2)
BASOPHILS NFR BLD AUTO: 0 %
BILIRUB SERPL-MCNC: 0.2 MG/DL
BUN SERPL-MCNC: 10.1 MG/DL (ref 6–20)
CALCIUM SERPL-MCNC: 8.8 MG/DL (ref 8.8–10.4)
CHLORIDE SERPL-SCNC: 105 MMOL/L (ref 98–107)
CREAT SERPL-MCNC: 0.78 MG/DL (ref 0.51–0.95)
EGFRCR SERPLBLD CKD-EPI 2021: >90 ML/MIN/1.73M2
EOSINOPHIL # BLD AUTO: 0.2 10E3/UL (ref 0–0.7)
EOSINOPHIL NFR BLD AUTO: 2 %
ERYTHROCYTE [DISTWIDTH] IN BLOOD BY AUTOMATED COUNT: 18 % (ref 10–15)
GLUCOSE SERPL-MCNC: 106 MG/DL (ref 70–99)
HCO3 SERPL-SCNC: 25 MMOL/L (ref 22–29)
HCT VFR BLD AUTO: 34.1 % (ref 35–47)
HGB BLD-MCNC: 10.7 G/DL (ref 11.7–15.7)
IMM GRANULOCYTES # BLD: 0.1 10E3/UL
IMM GRANULOCYTES NFR BLD: 1 %
LYMPHOCYTES # BLD AUTO: 1.6 10E3/UL (ref 0.8–5.3)
LYMPHOCYTES NFR BLD AUTO: 20 %
MCH RBC QN AUTO: 22.2 PG (ref 26.5–33)
MCHC RBC AUTO-ENTMCNC: 31.4 G/DL (ref 31.5–36.5)
MCV RBC AUTO: 71 FL (ref 78–100)
MONOCYTES # BLD AUTO: 0.4 10E3/UL (ref 0–1.3)
MONOCYTES NFR BLD AUTO: 5 %
NEUTROPHILS # BLD AUTO: 5.7 10E3/UL (ref 1.6–8.3)
NEUTROPHILS NFR BLD AUTO: 71 %
NRBC # BLD AUTO: 0 10E3/UL
NRBC BLD AUTO-RTO: 0 /100
PLATELET # BLD AUTO: 311 10E3/UL (ref 150–450)
POTASSIUM SERPL-SCNC: 4.4 MMOL/L (ref 3.4–5.3)
PROT SERPL-MCNC: 6.9 G/DL (ref 6.4–8.3)
RBC # BLD AUTO: 4.82 10E6/UL (ref 3.8–5.2)
SODIUM SERPL-SCNC: 137 MMOL/L (ref 135–145)
WBC # BLD AUTO: 8 10E3/UL (ref 4–11)

## 2025-02-17 PROCEDURE — 85025 COMPLETE CBC W/AUTO DIFF WBC: CPT | Performed by: NURSE PRACTITIONER

## 2025-02-17 PROCEDURE — 99496 TRANSJ CARE MGMT HIGH F2F 7D: CPT

## 2025-02-17 PROCEDURE — G0463 HOSPITAL OUTPT CLINIC VISIT: HCPCS

## 2025-02-17 PROCEDURE — 250N000011 HC RX IP 250 OP 636: Mod: JZ | Performed by: NURSE PRACTITIONER

## 2025-02-17 PROCEDURE — 36592 COLLECT BLOOD FROM PICC: CPT | Performed by: NURSE PRACTITIONER

## 2025-02-17 PROCEDURE — 84132 ASSAY OF SERUM POTASSIUM: CPT | Performed by: NURSE PRACTITIONER

## 2025-02-17 RX ADMIN — ALTEPLASE 2 MG: 2.2 INJECTION, POWDER, LYOPHILIZED, FOR SOLUTION INTRAVENOUS at 12:31

## 2025-02-17 ASSESSMENT — PAIN SCALES - GENERAL: PAINLEVEL_OUTOF10: MODERATE PAIN (4)

## 2025-02-17 NOTE — NURSING NOTE
Infusion Nursing Note:  Gabbie MEETA Tavares presents today for PICC dressing change and lab draw.    Patient seen by provider today: No   present during visit today: Not Applicable.    Note: Unable to obtain blood return via PICC line upon patient arrival. Sterile dressing change performed per facility protocol and alteplase instilled. Brisk blood return obtained after 60 minutes dwell time. Labs drawn without difficulty and both lumens flushed with NS and locked. New curos caps applied.       Intravenous Access:  Labs drawn without difficulty.  PICC.    Treatment Conditions:  Not Applicable.      Post Infusion Assessment:  Patient tolerated flush and lab draw without incident.  Site patent and intact, free from redness, edema or discomfort.  No evidence of extravasations.  Both lumens flushed with NS and locked. New curos caps applied.       Discharge Plan:   Discharge instructions reviewed with: Patient.  Patient and/or family verbalized understanding of discharge instructions and all questions answered.  AVS to patient via [employer].  Patient will return 2/24/2025 for next appointment.   Patient discharged in stable condition accompanied by: daughters.  Departure Mode: Ambulatory.      Michelle Rivera RN
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
## 2025-02-17 NOTE — NURSING NOTE
"Chief Complaint   Patient presents with    Hospital F/U       Initial /80   Pulse 76   Temp 97.9  F (36.6  C) (Tympanic)   Resp 20   Wt (!) 137.8 kg (303 lb 12.8 oz)   LMP 02/05/2025 (Exact Date)   SpO2 98%   BMI 50.55 kg/m   Estimated body mass index is 50.55 kg/m  as calculated from the following:    Height as of 2/10/25: 1.651 m (5' 5\").    Weight as of this encounter: 137.8 kg (303 lb 12.8 oz).  Medication Review: complete    The next two questions are to help us understand your food security.  If you are feeling you need any assistance in this area, we have resources available to support you today.           No data to display                  Health Care Directive:  Patient does not have a Health Care Directive: Discussed advance care planning with patient; however, patient declined at this time.    Paula Hong, NEERAJ      "

## 2025-02-17 NOTE — LETTER
2025    Gabbie Tavares   1990        To Whom it May Concern;    Please excuse Gabbie Tavares from work for a healthcare visit on 2025. Please excuse from work from 2/10/2025-2025, extended if needed per specialist recommendation.    Sincerely,        SINCERE BRYANT CNP

## 2025-02-24 ENCOUNTER — HOSPITAL ENCOUNTER (OUTPATIENT)
Dept: INFUSION THERAPY | Facility: OTHER | Age: 35
Discharge: HOME | End: 2025-02-24
Admitting: FAMILY MEDICINE
Payer: MEDICAID

## 2025-02-24 ENCOUNTER — MYC MEDICAL ADVICE (OUTPATIENT)
Dept: FAMILY MEDICINE | Facility: OTHER | Age: 35
End: 2025-02-24
Payer: MEDICAID

## 2025-02-24 VITALS
DIASTOLIC BLOOD PRESSURE: 64 MMHG | TEMPERATURE: 98.2 F | SYSTOLIC BLOOD PRESSURE: 135 MMHG | HEART RATE: 78 BPM | RESPIRATION RATE: 18 BRPM

## 2025-02-24 DIAGNOSIS — B00.3 MENINGITIS DUE TO HERPES SIMPLEX VIRUS TYPE 2 (HSV-2): ICD-10-CM

## 2025-02-24 DIAGNOSIS — E61.1 IRON DEFICIENCY: Primary | ICD-10-CM

## 2025-02-24 DIAGNOSIS — M79.2 NEUROPATHIC PAIN: Primary | ICD-10-CM

## 2025-02-24 PROCEDURE — 258N000003 HC RX IP 258 OP 636: Performed by: NURSE PRACTITIONER

## 2025-02-24 PROCEDURE — 96366 THER/PROPH/DIAG IV INF ADDON: CPT

## 2025-02-24 PROCEDURE — 250N000011 HC RX IP 250 OP 636: Performed by: NURSE PRACTITIONER

## 2025-02-24 PROCEDURE — 96365 THER/PROPH/DIAG IV INF INIT: CPT

## 2025-02-24 RX ORDER — DIPHENHYDRAMINE HYDROCHLORIDE 50 MG/ML
50 INJECTION INTRAMUSCULAR; INTRAVENOUS
Start: 2025-02-26

## 2025-02-24 RX ORDER — GABAPENTIN 100 MG/1
100 CAPSULE ORAL 3 TIMES DAILY
Qty: 90 CAPSULE | Refills: 0 | Status: SHIPPED | OUTPATIENT
Start: 2025-02-24

## 2025-02-24 RX ORDER — METHYLPREDNISOLONE SODIUM SUCCINATE 40 MG/ML
40 INJECTION INTRAMUSCULAR; INTRAVENOUS
Start: 2025-02-26

## 2025-02-24 RX ORDER — ALBUTEROL SULFATE 90 UG/1
1-2 INHALANT RESPIRATORY (INHALATION)
Start: 2025-02-26

## 2025-02-24 RX ORDER — ALBUTEROL SULFATE 0.83 MG/ML
2.5 SOLUTION RESPIRATORY (INHALATION)
OUTPATIENT
Start: 2025-02-26

## 2025-02-24 RX ORDER — EPINEPHRINE 1 MG/ML
0.3 INJECTION, SOLUTION, CONCENTRATE INTRAVENOUS EVERY 5 MIN PRN
OUTPATIENT
Start: 2025-02-26

## 2025-02-24 RX ORDER — DIPHENHYDRAMINE HYDROCHLORIDE 50 MG/ML
25 INJECTION INTRAMUSCULAR; INTRAVENOUS
Start: 2025-02-26

## 2025-02-24 RX ADMIN — IRON SUCROSE 300 MG: 20 INJECTION, SOLUTION INTRAVENOUS at 13:59

## 2025-02-24 NOTE — NURSING NOTE
Infusion Nursing Note:  Gabbie Tavares presents today for Venofer 1/3.    Patient seen by provider today: No   present during visit today: Not Applicable.    Note: N/A.      Intravenous Access:  Peripheral IV placed.    Treatment Conditions:  Not Applicable.      Post Infusion Assessment:  Patient tolerated infusion without incident.  Blood return noted pre and post infusion.  Site patent and intact, free from redness, edema or discomfort.  No evidence of extravasations.  Access discontinued per protocol.       Discharge Plan:   Discharge instructions reviewed with: Patient.  Patient and/or family verbalized understanding of discharge instructions and all questions answered.  Copy of AVS declined.  Patient will return 3-3-25 for next appointment.  AVS to patient via [employer].   Patient discharged in stable condition accompanied by: self.  Departure Mode: Ambulatory.      Jennifer Hansen RN

## 2025-02-24 NOTE — TELEPHONE ENCOUNTER
This pain is likely neuropathic. I would treat as such at this point in the course. We could try gabapentin which would offer her relief more quickly. A side effect is drowsiness. I am able to send this in for her if she would like to try. Would recommend a follow up OV with myself or DMO her PCP at her convenience to continue to monitor ongoing symptoms/ treatment effectiveness.  SINCERE BRYANT CNP on 2/24/2025 at 1:28 PM

## 2025-02-24 NOTE — TELEPHONE ENCOUNTER
Pt has been having extra leg/hip/neck pain and has been using her Oxy and Toradol more frequently. Wondering if she can get a refill of Oxy and Toradol?     Toradol 10 mg Q 6hrs PRN ordered 2/13/25 by Haley Valverde. #20  Oxycodone HCl (IR) 5-10 mg Q 6 hrs PRN ordered 2/13/25 by Haley Valverde. (3 day supply)    LOV 2/17/25:  Patient was admitted to [facility] on 2/10/2025 and discharged home on 2/13/2025 due to HSV meningitis.  She notes that her condition has improved.  She is no longer having headaches.  She does still have some residual back pain.  Denies any red flag symptoms today.  On exam today vital signs are stable, neurologically she is intact.  No red flag symptoms.     Recommend OV since controlled substance.     Routing to provider to review and respond.  Juan A Khoury RN on 2/24/2025 at 11:29 AM

## 2025-02-24 NOTE — TELEPHONE ENCOUNTER
Willing to try Gabapentin.     Hussein'd up 100 mg dose. Unsure of frequency.     Routing to provider to review and respond.  Juan A Khoury RN on 2/24/2025 at 1:55 PM

## 2025-02-24 NOTE — TELEPHONE ENCOUNTER
I sent in the 100 mg dosing. She can try first dose at bedtime as it can cause drowsiness. Please have her check in with me on how this is helping her pain next week (My Chart Okay). We can titrate up if needed.  SINCERE BRYANT CNP on 2/24/2025 at 1:58 PM

## 2025-03-03 ENCOUNTER — HOSPITAL ENCOUNTER (OUTPATIENT)
Dept: INFUSION THERAPY | Facility: OTHER | Age: 35
Discharge: HOME | End: 2025-03-03
Admitting: FAMILY MEDICINE

## 2025-03-03 VITALS
TEMPERATURE: 98.2 F | RESPIRATION RATE: 18 BRPM | DIASTOLIC BLOOD PRESSURE: 58 MMHG | SYSTOLIC BLOOD PRESSURE: 116 MMHG | HEART RATE: 70 BPM

## 2025-03-03 DIAGNOSIS — E61.1 IRON DEFICIENCY: Primary | ICD-10-CM

## 2025-03-03 PROCEDURE — 96366 THER/PROPH/DIAG IV INF ADDON: CPT

## 2025-03-03 PROCEDURE — 258N000003 HC RX IP 258 OP 636: Performed by: NURSE PRACTITIONER

## 2025-03-03 PROCEDURE — 250N000011 HC RX IP 250 OP 636: Performed by: NURSE PRACTITIONER

## 2025-03-03 PROCEDURE — 96365 THER/PROPH/DIAG IV INF INIT: CPT

## 2025-03-03 RX ORDER — ALBUTEROL SULFATE 0.83 MG/ML
2.5 SOLUTION RESPIRATORY (INHALATION)
OUTPATIENT
Start: 2025-03-04

## 2025-03-03 RX ORDER — DIPHENHYDRAMINE HYDROCHLORIDE 50 MG/ML
50 INJECTION, SOLUTION INTRAMUSCULAR; INTRAVENOUS
Start: 2025-03-04

## 2025-03-03 RX ORDER — DIPHENHYDRAMINE HYDROCHLORIDE 50 MG/ML
25 INJECTION, SOLUTION INTRAMUSCULAR; INTRAVENOUS
Start: 2025-03-04

## 2025-03-03 RX ORDER — EPINEPHRINE 1 MG/ML
0.3 INJECTION, SOLUTION, CONCENTRATE INTRAVENOUS EVERY 5 MIN PRN
OUTPATIENT
Start: 2025-03-04

## 2025-03-03 RX ORDER — METHYLPREDNISOLONE SODIUM SUCCINATE 40 MG/ML
40 INJECTION INTRAMUSCULAR; INTRAVENOUS
Start: 2025-03-04

## 2025-03-03 RX ORDER — ALBUTEROL SULFATE 90 UG/1
1-2 INHALANT RESPIRATORY (INHALATION)
Start: 2025-03-04

## 2025-03-03 RX ADMIN — SODIUM CHLORIDE 250 ML: 0.9 INJECTION, SOLUTION INTRAVENOUS at 13:17

## 2025-03-03 RX ADMIN — IRON SUCROSE 300 MG: 20 INJECTION, SOLUTION INTRAVENOUS at 13:17

## 2025-03-03 NOTE — NURSING NOTE
Infusion Nursing Note:  Gabbie Tavares presents today for Venofer.    Patient seen by provider today: No   present during visit today: Not Applicable.    Note: N/A.    Intravenous Access:  Peripheral IV placed.    Treatment Conditions:  Not Applicable.    Post Infusion Assessment:  Patient tolerated infusion without incident.  Blood return noted pre and post infusion.  Site patent and intact, free from redness, edema or discomfort.  No evidence of extravasations.  Access discontinued per protocol.     Discharge Plan:   Discharge instructions reviewed with: Patient.  Patient and/or family verbalized understanding of discharge instructions and all questions answered.  Copy of AVS declined by patient and/or family.  Patient will return 3/10/25 for next appointment.  AVS to patient via MYCHART.    Patient discharged in stable condition accompanied by: self.  Departure Mode: Ambulatory.      Chrystal Childers RN

## 2025-03-10 ENCOUNTER — HOSPITAL ENCOUNTER (OUTPATIENT)
Dept: INFUSION THERAPY | Facility: OTHER | Age: 35
Discharge: HOME | End: 2025-03-10
Admitting: FAMILY MEDICINE

## 2025-03-10 VITALS
DIASTOLIC BLOOD PRESSURE: 63 MMHG | TEMPERATURE: 97.7 F | SYSTOLIC BLOOD PRESSURE: 117 MMHG | RESPIRATION RATE: 18 BRPM | HEART RATE: 64 BPM

## 2025-03-10 DIAGNOSIS — E61.1 IRON DEFICIENCY: Primary | ICD-10-CM

## 2025-03-10 PROCEDURE — 250N000011 HC RX IP 250 OP 636: Performed by: NURSE PRACTITIONER

## 2025-03-10 PROCEDURE — 96366 THER/PROPH/DIAG IV INF ADDON: CPT

## 2025-03-10 PROCEDURE — 258N000003 HC RX IP 258 OP 636: Performed by: NURSE PRACTITIONER

## 2025-03-10 PROCEDURE — 96365 THER/PROPH/DIAG IV INF INIT: CPT

## 2025-03-10 RX ORDER — ALBUTEROL SULFATE 90 UG/1
1-2 INHALANT RESPIRATORY (INHALATION)
Status: CANCELLED
Start: 2025-03-11

## 2025-03-10 RX ORDER — METHYLPREDNISOLONE SODIUM SUCCINATE 40 MG/ML
40 INJECTION INTRAMUSCULAR; INTRAVENOUS
Status: CANCELLED
Start: 2025-03-11

## 2025-03-10 RX ORDER — EPINEPHRINE 1 MG/ML
0.3 INJECTION, SOLUTION, CONCENTRATE INTRAVENOUS EVERY 5 MIN PRN
Status: CANCELLED | OUTPATIENT
Start: 2025-03-11

## 2025-03-10 RX ORDER — ALBUTEROL SULFATE 0.83 MG/ML
2.5 SOLUTION RESPIRATORY (INHALATION)
Status: CANCELLED | OUTPATIENT
Start: 2025-03-11

## 2025-03-10 RX ORDER — DIPHENHYDRAMINE HYDROCHLORIDE 50 MG/ML
50 INJECTION, SOLUTION INTRAMUSCULAR; INTRAVENOUS
Status: CANCELLED
Start: 2025-03-11

## 2025-03-10 RX ORDER — DIPHENHYDRAMINE HYDROCHLORIDE 50 MG/ML
25 INJECTION, SOLUTION INTRAMUSCULAR; INTRAVENOUS
Status: CANCELLED
Start: 2025-03-11

## 2025-03-10 RX ADMIN — IRON SUCROSE 300 MG: 20 INJECTION, SOLUTION INTRAVENOUS at 13:39

## 2025-03-10 NOTE — ED PROVIDER NOTES
Procedure note:    Consent was signed prior to the procedure.  The patient was in the upright position.  The low back was prepped with Betadine and draped in sterile fashion.  The skin and deeper structures were anesthetized with 1% lidocaine without epinephrine.  The L1 and L2 interspace approximation was entered with the standard needle in the LP kit.  I hit bone and redirected multiple times and was unable to enter the subdural space.  3 attempts were made.  After this the procedure was terminated and consultation with anesthesia was obtained.  Please see their documentation they were able to obtain fluid which was sent to the lab for analysis.     Robert Pedro MD  03/10/25 [id number]

## 2025-03-28 ENCOUNTER — PATIENT OUTREACH (OUTPATIENT)
Dept: CARE COORDINATION | Facility: CLINIC | Age: 35
End: 2025-03-28

## 2025-03-28 NOTE — PROGRESS NOTES
Clinic Care Coordination Contact  [facility]/Voicemail    Plan: Care Coordinator attempted to reach patient. No answer. Will sent message via [employer]. Care Coordinator be available to support if she connects.  Faiza Walter RN on 3/31/2025 at 3:12 PM            Clinic Care Coordination Contact  Care Team Conversations    [facility] Referral received. Chart reviewed.     Faiza Walter RN on 3/28/2025 at 8:26 AM

## 2025-05-17 ENCOUNTER — HEALTH MAINTENANCE LETTER (OUTPATIENT)
Age: 35
End: 2025-05-17

## 2025-08-30 ENCOUNTER — HEALTH MAINTENANCE LETTER (OUTPATIENT)
Age: 35
End: 2025-08-30

## (undated) RX ORDER — DEXAMETHASONE SODIUM PHOSPHATE 10 MG/ML
INJECTION, SOLUTION INTRAMUSCULAR; INTRAVENOUS
Status: DISPENSED
Start: 2020-10-16

## (undated) RX ORDER — ONDANSETRON 2 MG/ML
INJECTION INTRAMUSCULAR; INTRAVENOUS
Status: DISPENSED
Start: 2025-02-10

## (undated) RX ORDER — IBUPROFEN 200 MG
TABLET ORAL
Status: DISPENSED
Start: 2024-11-13

## (undated) RX ORDER — ACETAMINOPHEN 500 MG
TABLET ORAL
Status: DISPENSED
Start: 2024-11-13

## (undated) RX ORDER — HYDROMORPHONE HYDROCHLORIDE 1 MG/ML
INJECTION, SOLUTION INTRAMUSCULAR; INTRAVENOUS; SUBCUTANEOUS
Status: DISPENSED
Start: 2025-02-10

## (undated) RX ORDER — CEFTRIAXONE 2 G/1
INJECTION, POWDER, FOR SOLUTION INTRAMUSCULAR; INTRAVENOUS
Status: DISPENSED
Start: 2025-02-10

## (undated) RX ORDER — ONDANSETRON 2 MG/ML
INJECTION INTRAMUSCULAR; INTRAVENOUS
Status: DISPENSED
Start: 2020-10-16

## (undated) RX ORDER — DEXAMETHASONE SODIUM PHOSPHATE 10 MG/ML
INJECTION, SOLUTION INTRAMUSCULAR; INTRAVENOUS
Status: DISPENSED
Start: 2025-02-10

## (undated) RX ORDER — ACYCLOVIR SODIUM 500 MG/10ML
INJECTION, SOLUTION INTRAVENOUS
Status: DISPENSED
Start: 2025-02-10

## (undated) RX ORDER — KETOROLAC TROMETHAMINE 30 MG/ML
INJECTION, SOLUTION INTRAMUSCULAR; INTRAVENOUS
Status: DISPENSED
Start: 2025-02-09

## (undated) RX ORDER — LIDOCAINE HYDROCHLORIDE 10 MG/ML
INJECTION, SOLUTION EPIDURAL; INFILTRATION; INTRACAUDAL; PERINEURAL
Status: DISPENSED
Start: 2025-02-09

## (undated) RX ORDER — KETOROLAC TROMETHAMINE 30 MG/ML
INJECTION, SOLUTION INTRAMUSCULAR; INTRAVENOUS
Status: DISPENSED
Start: 2020-10-20

## (undated) RX ORDER — MORPHINE SULFATE 4 MG/ML
INJECTION, SOLUTION INTRAMUSCULAR; INTRAVENOUS
Status: DISPENSED
Start: 2020-10-16

## (undated) RX ORDER — DIPHENHYDRAMINE HYDROCHLORIDE 50 MG/ML
INJECTION INTRAMUSCULAR; INTRAVENOUS
Status: DISPENSED
Start: 2025-02-07

## (undated) RX ORDER — OXYCODONE HYDROCHLORIDE 5 MG/1
TABLET ORAL
Status: DISPENSED
Start: 2025-02-10

## (undated) RX ORDER — LORAZEPAM 2 MG/ML
INJECTION INTRAMUSCULAR
Status: DISPENSED
Start: 2024-11-13

## (undated) RX ORDER — HYDROMORPHONE HYDROCHLORIDE 1 MG/ML
INJECTION, SOLUTION INTRAMUSCULAR; INTRAVENOUS; SUBCUTANEOUS
Status: DISPENSED
Start: 2025-02-09

## (undated) RX ORDER — SODIUM CHLORIDE 9 MG/ML
INJECTION, SOLUTION INTRAVENOUS
Status: DISPENSED
Start: 2020-10-16

## (undated) RX ORDER — DOXYCYCLINE 100 MG/1
CAPSULE ORAL
Status: DISPENSED
Start: 2024-11-14

## (undated) RX ORDER — CEFTRIAXONE 2 G/1
INJECTION, POWDER, FOR SOLUTION INTRAMUSCULAR; INTRAVENOUS
Status: DISPENSED
Start: 2024-11-14

## (undated) RX ORDER — KETOROLAC TROMETHAMINE 30 MG/ML
INJECTION, SOLUTION INTRAMUSCULAR; INTRAVENOUS
Status: DISPENSED
Start: 2025-02-07